# Patient Record
Sex: FEMALE | Race: WHITE | Employment: PART TIME | URBAN - METROPOLITAN AREA
[De-identification: names, ages, dates, MRNs, and addresses within clinical notes are randomized per-mention and may not be internally consistent; named-entity substitution may affect disease eponyms.]

---

## 2017-01-07 RX ORDER — AMLODIPINE BESYLATE 5 MG/1
TABLET ORAL
Qty: 30 TABLET | Refills: 6 | Status: SHIPPED | OUTPATIENT
Start: 2017-01-07 | End: 2017-06-20

## 2017-01-13 ENCOUNTER — TELEPHONE (OUTPATIENT)
Dept: INTERNAL MEDICINE CLINIC | Facility: CLINIC | Age: 47
End: 2017-01-13

## 2017-01-13 NOTE — TELEPHONE ENCOUNTER
Pt called in requesting her normal routine labs that she has every 3 months to please be placed. Pt is looking to come in tomorrow or early next week to get her labs done. Pt is asking for a call back once labs are placed, please.

## 2017-01-16 ENCOUNTER — TELEPHONE (OUTPATIENT)
Dept: INTERNAL MEDICINE CLINIC | Facility: CLINIC | Age: 47
End: 2017-01-16

## 2017-01-16 DIAGNOSIS — E55.9 VITAMIN D DEFICIENCY: ICD-10-CM

## 2017-01-16 DIAGNOSIS — E11.9 TYPE 2 DIABETES MELLITUS WITHOUT COMPLICATION, WITHOUT LONG-TERM CURRENT USE OF INSULIN (HCC): Primary | ICD-10-CM

## 2017-01-20 ENCOUNTER — LAB ENCOUNTER (OUTPATIENT)
Dept: LAB | Age: 47
End: 2017-01-20
Attending: INTERNAL MEDICINE
Payer: MEDICAID

## 2017-01-20 DIAGNOSIS — E55.9 VITAMIN D DEFICIENCY: ICD-10-CM

## 2017-01-20 DIAGNOSIS — E11.9 TYPE 2 DIABETES MELLITUS WITHOUT COMPLICATION, WITHOUT LONG-TERM CURRENT USE OF INSULIN (HCC): ICD-10-CM

## 2017-01-20 LAB
ALBUMIN SERPL BCP-MCNC: 3.5 G/DL (ref 3.5–4.8)
ALBUMIN/GLOB SERPL: 1.1 {RATIO} (ref 1–2)
ALP SERPL-CCNC: 44 U/L (ref 32–100)
ALT SERPL-CCNC: 38 U/L (ref 14–54)
ANION GAP SERPL CALC-SCNC: 9 MMOL/L (ref 0–18)
AST SERPL-CCNC: 51 U/L (ref 15–41)
BASOPHILS # BLD: 0.1 K/UL (ref 0–0.2)
BASOPHILS NFR BLD: 1 %
BILIRUB SERPL-MCNC: 0.7 MG/DL (ref 0.3–1.2)
BUN SERPL-MCNC: 12 MG/DL (ref 8–20)
BUN/CREAT SERPL: 15.2 (ref 10–20)
CALCIUM SERPL-MCNC: 8.8 MG/DL (ref 8.5–10.5)
CHLORIDE SERPL-SCNC: 103 MMOL/L (ref 95–110)
CHOLEST SERPL-MCNC: 214 MG/DL (ref 110–200)
CO2 SERPL-SCNC: 22 MMOL/L (ref 22–32)
CREAT SERPL-MCNC: 0.79 MG/DL (ref 0.5–1.5)
CREAT UR-MCNC: 71.9 MG/DL
EOSINOPHIL # BLD: 0.2 K/UL (ref 0–0.7)
EOSINOPHIL NFR BLD: 2 %
ERYTHROCYTE [DISTWIDTH] IN BLOOD BY AUTOMATED COUNT: 13.2 % (ref 11–15)
GLOBULIN PLAS-MCNC: 3.2 G/DL (ref 2.5–3.7)
GLUCOSE SERPL-MCNC: 139 MG/DL (ref 70–99)
HCT VFR BLD AUTO: 43.7 % (ref 35–48)
HDLC SERPL-MCNC: 44 MG/DL
HGB BLD-MCNC: 15.1 G/DL (ref 12–16)
LDLC SERPL CALC-MCNC: 132 MG/DL (ref 0–99)
LYMPHOCYTES # BLD: 2.4 K/UL (ref 1–4)
LYMPHOCYTES NFR BLD: 32 %
MCH RBC QN AUTO: 34.4 PG (ref 27–32)
MCHC RBC AUTO-ENTMCNC: 34.6 G/DL (ref 32–37)
MCV RBC AUTO: 99.4 FL (ref 80–100)
MICROALBUMIN UR-MCNC: 0 MG/DL (ref 0–1.8)
MICROALBUMIN/CREAT UR: 0 MG/G{CREAT} (ref 0–20)
MONOCYTES # BLD: 0.5 K/UL (ref 0–1)
MONOCYTES NFR BLD: 6 %
NEUTROPHILS # BLD AUTO: 4.4 K/UL (ref 1.8–7.7)
NEUTROPHILS NFR BLD: 58 %
NONHDLC SERPL-MCNC: 170 MG/DL
OSMOLALITY UR CALC.SUM OF ELEC: 280 MOSM/KG (ref 275–295)
PLATELET # BLD AUTO: 86 K/UL (ref 140–400)
PMV BLD AUTO: 10.6 FL (ref 7.4–10.3)
POTASSIUM SERPL-SCNC: 4.1 MMOL/L (ref 3.3–5.1)
PROT SERPL-MCNC: 6.7 G/DL (ref 5.9–8.4)
RBC # BLD AUTO: 4.4 M/UL (ref 3.7–5.4)
SODIUM SERPL-SCNC: 134 MMOL/L (ref 136–144)
TRIGL SERPL-MCNC: 191 MG/DL (ref 1–149)
WBC # BLD AUTO: 7.6 K/UL (ref 4–11)

## 2017-01-20 PROCEDURE — 36415 COLL VENOUS BLD VENIPUNCTURE: CPT

## 2017-01-20 PROCEDURE — 82570 ASSAY OF URINE CREATININE: CPT

## 2017-01-20 PROCEDURE — 85025 COMPLETE CBC W/AUTO DIFF WBC: CPT

## 2017-01-20 PROCEDURE — 80053 COMPREHEN METABOLIC PANEL: CPT

## 2017-01-20 PROCEDURE — 82043 UR ALBUMIN QUANTITATIVE: CPT

## 2017-01-20 PROCEDURE — 82306 VITAMIN D 25 HYDROXY: CPT

## 2017-01-20 PROCEDURE — 80061 LIPID PANEL: CPT

## 2017-01-20 PROCEDURE — 83036 HEMOGLOBIN GLYCOSYLATED A1C: CPT

## 2017-01-21 LAB — HBA1C MFR BLD: 8.8 % (ref 4–6)

## 2017-01-23 LAB — 25(OH)D3 SERPL-MCNC: 13.6 NG/ML

## 2017-01-24 ENCOUNTER — OFFICE VISIT (OUTPATIENT)
Dept: INTERNAL MEDICINE CLINIC | Facility: CLINIC | Age: 47
End: 2017-01-24

## 2017-01-24 ENCOUNTER — TELEPHONE (OUTPATIENT)
Dept: INTERNAL MEDICINE CLINIC | Facility: CLINIC | Age: 47
End: 2017-01-24

## 2017-01-24 VITALS
HEART RATE: 85 BPM | BODY MASS INDEX: 36 KG/M2 | SYSTOLIC BLOOD PRESSURE: 121 MMHG | WEIGHT: 214 LBS | RESPIRATION RATE: 21 BRPM | DIASTOLIC BLOOD PRESSURE: 74 MMHG | TEMPERATURE: 98 F

## 2017-01-24 DIAGNOSIS — R74.8 ABNORMAL LIVER ENZYMES: ICD-10-CM

## 2017-01-24 DIAGNOSIS — E11.8 TYPE 2 DIABETES MELLITUS WITH COMPLICATION, WITHOUT LONG-TERM CURRENT USE OF INSULIN (HCC): ICD-10-CM

## 2017-01-24 DIAGNOSIS — D69.6 THROMBOCYTOPENIA (HCC): Primary | ICD-10-CM

## 2017-01-24 DIAGNOSIS — R53.81 MALAISE: ICD-10-CM

## 2017-01-24 PROCEDURE — 99212 OFFICE O/P EST SF 10 MIN: CPT | Performed by: INTERNAL MEDICINE

## 2017-01-24 PROCEDURE — 99214 OFFICE O/P EST MOD 30 MIN: CPT | Performed by: INTERNAL MEDICINE

## 2017-01-24 RX ORDER — DIAPER,BRIEF,ADULT, DISPOSABLE
EACH MISCELLANEOUS
Qty: 100 STRIP | Refills: 11 | Status: SHIPPED | OUTPATIENT
Start: 2017-01-24 | End: 2017-01-28

## 2017-01-25 ENCOUNTER — TELEPHONE (OUTPATIENT)
Dept: INTERNAL MEDICINE CLINIC | Facility: CLINIC | Age: 47
End: 2017-01-25

## 2017-01-25 NOTE — TELEPHONE ENCOUNTER
PT stts she made a appt for ultra sound on 2/2 and wanted to know if she needs to have her blood drawn on 1/26.  Please advise

## 2017-01-25 NOTE — PROGRESS NOTES
HPI:    Patient ID: Munir Polk is a 55year old female presents for follow-up of multiple medical conditions.     HPI  Patient reports that she has been feeling extremely fatigued last several months, spends a lot of time in bed, he has good and SPRAY BY EACH NARE ROUTE DAILY Disp: 1 Bottle Rfl: 6   Needle, Disp, (BD DISP NEEDLES) 30G X 1/2\" Does not apply Misc Pen needles to be used for Byetta injections Disp: 200 each Rfl: 3   VENTOLIN  (90 BASE) MCG/ACT Inhalation Aero Soln INHALE 2 PUF membrane is not erythematous. No cerumen present  Mouth/Throat: Oropharynx is clear and moist. No posterior oropharyngeal erythema. Postnasal drainage is present   Eyes: EOM are normal. Pupils are equal, round, and reactive to light. No scleral icterus. pen 3      Sig: Inject 20 units  Sq  At bed time      TRUETRACK TEST In Vitro Strip 100 strip 11      Sig: Test  tid           Imaging & Referrals:  US ABDOMEN COMPLETE (CPT=76700)         LA#[id number]

## 2017-01-26 ENCOUNTER — LAB ENCOUNTER (OUTPATIENT)
Dept: LAB | Age: 47
End: 2017-01-26
Attending: INTERNAL MEDICINE
Payer: MEDICAID

## 2017-01-26 DIAGNOSIS — D69.6 THROMBOCYTOPENIA (HCC): ICD-10-CM

## 2017-01-26 LAB
BASOPHILS # BLD: 0.1 K/UL (ref 0–0.2)
BASOPHILS NFR BLD: 1 %
EOSINOPHIL # BLD: 0.1 K/UL (ref 0–0.7)
EOSINOPHIL NFR BLD: 2 %
ERYTHROCYTE [DISTWIDTH] IN BLOOD BY AUTOMATED COUNT: 13.3 % (ref 11–15)
FOLATE SERPL-MCNC: 18.5 NG/ML
HCT VFR BLD AUTO: 46 % (ref 35–48)
HGB BLD-MCNC: 15.9 G/DL (ref 12–16)
LYMPHOCYTES # BLD: 2.1 K/UL (ref 1–4)
LYMPHOCYTES NFR BLD: 38 %
MCH RBC QN AUTO: 34.5 PG (ref 27–32)
MCHC RBC AUTO-ENTMCNC: 34.7 G/DL (ref 32–37)
MCV RBC AUTO: 99.4 FL (ref 80–100)
MONOCYTES # BLD: 0.3 K/UL (ref 0–1)
MONOCYTES NFR BLD: 6 %
NEUTROPHILS # BLD AUTO: 3 K/UL (ref 1.8–7.7)
NEUTROPHILS NFR BLD: 53 %
PLATELET # BLD AUTO: 185 K/UL (ref 140–400)
PMV BLD AUTO: 10 FL (ref 7.4–10.3)
RBC # BLD AUTO: 4.62 M/UL (ref 3.7–5.4)
VIT B12 SERPL-MCNC: 630 PG/ML (ref 181–914)
WBC # BLD AUTO: 5.6 K/UL (ref 4–11)

## 2017-01-26 PROCEDURE — 36415 COLL VENOUS BLD VENIPUNCTURE: CPT

## 2017-01-26 PROCEDURE — 82746 ASSAY OF FOLIC ACID SERUM: CPT

## 2017-01-26 PROCEDURE — 82607 VITAMIN B-12: CPT

## 2017-01-26 PROCEDURE — 85025 COMPLETE CBC W/AUTO DIFF WBC: CPT

## 2017-01-27 ENCOUNTER — TELEPHONE (OUTPATIENT)
Dept: INTERNAL MEDICINE CLINIC | Facility: CLINIC | Age: 47
End: 2017-01-27

## 2017-01-27 NOTE — TELEPHONE ENCOUNTER
Pt said Dr Mio Bowman is working on PA for her test strips- nothing in Epic  Tests 3 times daily  Said discussed at 1/24 appt  Out of strips

## 2017-01-28 ENCOUNTER — TELEPHONE (OUTPATIENT)
Dept: INTERNAL MEDICINE CLINIC | Facility: CLINIC | Age: 47
End: 2017-01-28

## 2017-01-28 PROBLEM — D69.6 THROMBOCYTOPENIA (HCC): Status: RESOLVED | Noted: 2017-01-24 | Resolved: 2017-01-28

## 2017-01-28 RX ORDER — DIAPER,BRIEF,ADULT, DISPOSABLE
EACH MISCELLANEOUS
Qty: 100 STRIP | Refills: 11 | Status: SHIPPED | OUTPATIENT
Start: 2017-01-28 | End: 2017-07-11

## 2017-01-28 NOTE — TELEPHONE ENCOUNTER
Spoke to patient reported that CBC platelet count is normal, normal vitamin R88 and folic acid.   Patient will start Lantus SoloSTAR insulin use 5 units at bedtime, monitor blood sugar to 3 times a day, spoke to patient and pharmacist while the fertilizatio

## 2017-01-30 NOTE — TELEPHONE ENCOUNTER
Pt is calling checking the status of PA    and pt also want know if Dr Anderson Simpson want pt to still take her US  Pt is requesting a call back

## 2017-02-01 NOTE — TELEPHONE ENCOUNTER
Pt stated she had the CBC done but wants to canceled US for tomorrow advised by Bessy Paul notes you want her to have the [address] , Pt agreed to have US        ASSESSMENT/PLAN:    Type 2 diabetes mellitus with complication, without long-term current use of insulin (hcc

## 2017-02-02 ENCOUNTER — HOSPITAL ENCOUNTER (OUTPATIENT)
Dept: ULTRASOUND IMAGING | Age: 47
Discharge: HOME | End: 2017-02-02
Attending: INTERNAL MEDICINE
Payer: MEDICAID

## 2017-02-02 DIAGNOSIS — D69.6 THROMBOCYTOPENIA (HCC): ICD-10-CM

## 2017-02-02 DIAGNOSIS — R74.8 ABNORMAL LIVER ENZYMES: ICD-10-CM

## 2017-02-02 PROCEDURE — 76705 ECHO EXAM OF ABDOMEN: CPT

## 2017-02-13 ENCOUNTER — TELEPHONE (OUTPATIENT)
Dept: INTERNAL MEDICINE CLINIC | Facility: CLINIC | Age: 47
End: 2017-02-13

## 2017-02-13 RX ORDER — CODEINE PHOSPHATE AND GUAIFENESIN 10; 100 MG/5ML; MG/5ML
5 SOLUTION ORAL 4 TIMES DAILY PRN
Qty: 240 ML | Refills: 0 | Status: SHIPPED
Start: 2017-02-13 | End: 2017-03-21

## 2017-02-13 RX ORDER — AMOXICILLIN AND CLAVULANATE POTASSIUM 875; 125 MG/1; MG/1
1 TABLET, FILM COATED ORAL 2 TIMES DAILY
Qty: 20 TABLET | Refills: 0 | Status: SHIPPED | OUTPATIENT
Start: 2017-02-13 | End: 2017-03-21

## 2017-02-13 NOTE — TELEPHONE ENCOUNTER
Reason for Call/Chief Complaint: Cold symptoms  Onset: Friday 2/10  Nursing Assessment/Associated Symptoms: Spoke to pt, she states that on Friday she went on very long walk, in and out of the cold and since then, she has felt sick.  States that she has swo operation and will call back if needed. Patient verbalizes understanding and agrees with plan.

## 2017-02-14 NOTE — TELEPHONE ENCOUNTER
I have never seen patient. Needs to see someone in AM or immed care to see if antibiotic needed.  Start delsym M OTC cough syrup for cough

## 2017-02-14 NOTE — TELEPHONE ENCOUNTER
Spoke to patient, will treat you with Augmentin, [facility], she will follow-up in 2 weeks if not feeling better, reported ultrasound of the abdomen showed hepatic steatosis, advised weight loss, low-fat diet georgie

## 2017-02-27 ENCOUNTER — TELEPHONE (OUTPATIENT)
Dept: INTERNAL MEDICINE CLINIC | Facility: CLINIC | Age: 47
End: 2017-02-27

## 2017-02-27 NOTE — TELEPHONE ENCOUNTER
Pt calling regarding having a bad cold. Pt state that she has the chills,  Pt also that she is on an antibiotic but it is not working. .. please advise

## 2017-02-27 NOTE — TELEPHONE ENCOUNTER
Reason for Call/Chief Complaint:   Head cold  Onset:   Friday  Nursing Assessment/Associated Symptoms:   Patient stated has a sore throat, chills temperature 98, runny/stuffy nose, body aches, headaches, post nasal, chest congestion, bilateral ear aches,

## 2017-02-28 NOTE — TELEPHONE ENCOUNTER
Spoke to patient, sounds like she has mild syndrome, will see him second day if needed she will report on Friday if she did not improve

## 2017-03-09 NOTE — TELEPHONE ENCOUNTER
Patient requesting refill for Cheratussin AC Syrup. LOV 10/28/16. Last refilled on 02/13/17 for 240 ml / 0 refills. Please advise.

## 2017-03-15 RX ORDER — CODEINE PHOSPHATE/GUAIFENESIN 20-200/10
LIQUID (ML) ORAL
Qty: 240 ML | Refills: 0 | OUTPATIENT
Start: 2017-03-15

## 2017-03-15 NOTE — TELEPHONE ENCOUNTER
Pt states she has a flu, states she feel head congestion. Pt has a dry cough, with low grade fever. Pt states it's a sinus infection (Yellow & green mucus)  Pt canceled her apt yesterday since she was not feeling well. Please advise.        *Tong martinez

## 2017-03-15 NOTE — TELEPHONE ENCOUNTER
Actions Requested: Appt scheduled, pt requesting rx. Situation/Background   Problem: cough, congestion, fever   Onset: 1 month   Associated Symptoms: productive cough, yellow sinus drainage, temperature 94.3 (verified with pt), nausea.    History of Same:

## 2017-03-15 NOTE — TELEPHONE ENCOUNTER
Call patient that she needs to be seen before I will continue cough syrup medication, and patient to my schedule this week if needed, call pharmacy denied prescription after talking to patient

## 2017-03-15 NOTE — TELEPHONE ENCOUNTER
Spoke with pt. Pt. States she don`t have a ride this week. Appt. Made for Tuesday. Prescription declined per nk until pt. See md.  Pt. Verbalized understanding.

## 2017-03-21 ENCOUNTER — TELEPHONE (OUTPATIENT)
Dept: INTERNAL MEDICINE CLINIC | Facility: CLINIC | Age: 47
End: 2017-03-21

## 2017-03-21 ENCOUNTER — OFFICE VISIT (OUTPATIENT)
Dept: INTERNAL MEDICINE CLINIC | Facility: CLINIC | Age: 47
End: 2017-03-21

## 2017-03-21 VITALS
TEMPERATURE: 98 F | HEART RATE: 81 BPM | RESPIRATION RATE: 20 BRPM | WEIGHT: 213.63 LBS | SYSTOLIC BLOOD PRESSURE: 135 MMHG | BODY MASS INDEX: 36 KG/M2 | DIASTOLIC BLOOD PRESSURE: 72 MMHG

## 2017-03-21 DIAGNOSIS — Z01.00 DIABETIC EYE EXAM (HCC): ICD-10-CM

## 2017-03-21 DIAGNOSIS — E78.00 PURE HYPERCHOLESTEROLEMIA: ICD-10-CM

## 2017-03-21 DIAGNOSIS — Z12.31 ENCOUNTER FOR SCREENING MAMMOGRAM FOR MALIGNANT NEOPLASM OF BREAST: ICD-10-CM

## 2017-03-21 DIAGNOSIS — E11.8 TYPE 2 DIABETES MELLITUS WITH COMPLICATION, WITH LONG-TERM CURRENT USE OF INSULIN (HCC): ICD-10-CM

## 2017-03-21 DIAGNOSIS — E11.9 DIABETIC EYE EXAM (HCC): ICD-10-CM

## 2017-03-21 DIAGNOSIS — R05.9 COUGH: Primary | ICD-10-CM

## 2017-03-21 DIAGNOSIS — Z79.4 TYPE 2 DIABETES MELLITUS WITH COMPLICATION, WITH LONG-TERM CURRENT USE OF INSULIN (HCC): ICD-10-CM

## 2017-03-21 PROCEDURE — 99212 OFFICE O/P EST SF 10 MIN: CPT | Performed by: INTERNAL MEDICINE

## 2017-03-21 PROCEDURE — 99214 OFFICE O/P EST MOD 30 MIN: CPT | Performed by: INTERNAL MEDICINE

## 2017-03-21 RX ORDER — CODEINE PHOSPHATE AND GUAIFENESIN 10; 100 MG/5ML; MG/5ML
5 SOLUTION ORAL EVERY 6 HOURS PRN
Qty: 236 ML | Refills: 0 | Status: SHIPPED | OUTPATIENT
Start: 2017-03-21 | End: 2017-09-26

## 2017-03-21 RX ORDER — PANTOPRAZOLE SODIUM 40 MG/1
40 TABLET, DELAYED RELEASE ORAL
Qty: 30 TABLET | Refills: 6 | Status: SHIPPED | OUTPATIENT
Start: 2017-03-21 | End: 2017-09-12

## 2017-03-21 RX ORDER — LORATADINE 10 MG/1
10 TABLET ORAL DAILY
Qty: 30 TABLET | Refills: 3 | Status: SHIPPED | OUTPATIENT
Start: 2017-03-21 | End: 2017-09-26

## 2017-03-21 RX ORDER — ERGOCALCIFEROL (VITAMIN D2) 1250 MCG
50000 CAPSULE ORAL WEEKLY
Qty: 12 CAPSULE | Refills: 1 | Status: SHIPPED | OUTPATIENT
Start: 2017-03-21 | End: 2017-04-20

## 2017-03-21 RX ORDER — MONTELUKAST SODIUM 10 MG/1
10 TABLET ORAL NIGHTLY
Qty: 30 TABLET | Refills: 6 | Status: SHIPPED | OUTPATIENT
Start: 2017-03-21 | End: 2018-06-19

## 2017-03-21 RX ORDER — ALBUTEROL SULFATE 90 UG/1
2 AEROSOL, METERED RESPIRATORY (INHALATION) EVERY 4 HOURS PRN
Qty: 1 INHALER | Refills: 3 | Status: SHIPPED | OUTPATIENT
Start: 2017-03-21 | End: 2017-09-22

## 2017-03-22 NOTE — PROGRESS NOTES
HPI:    Patient ID: Sloane Borja is a 55year old female presents for evaluation of the cough, follow-up of diabetes    HPI  Patient reports this for about a month she has been coughing, hitting intermittent chills sweating, bothered by every 6 (six) hours as needed for cough. Disp: 236 mL Rfl: 0   Albuterol Sulfate HFA (PROAIR HFA) 108 (90 Base) MCG/ACT Inhalation Aero Soln Inhale 2 puffs into the lungs every 4 (four) hours as needed for Wheezing.  Disp: 1 Inhaler Rfl: 3   Pantoprazole So apply Kit by Does not apply route. Test 3 times a day Disp:  Rfl:    Blood Glucose Calibration (TRUETRACK GLUCOSE CONTROL VI) by In Vitro route.  Test 3 times a day Disp:  Rfl:      Allergies:  Clarithromycin            Tavist [Clemastine]        /72 Neurological: She is alert and oriented to person, place, and time. No cranial nerve deficit or motor deficit. Gait normal.   Skin: Skin is warm and dry. No rash noted. No erythema. Psychiatric: She has a normal mood and affect.  Her behavior is normal. Referrals:  OPHTHALMOLOGY - INTERNAL  ENT - INTERNAL  XR CHEST PA + LAT CHEST (CPT=71020)  SABINO SCREENING BILAT (CPT=77067)         [facility]#[id number]

## 2017-05-05 RX ORDER — GLIPIZIDE 5 MG/1
TABLET, FILM COATED, EXTENDED RELEASE ORAL
Qty: 36 TABLET | Refills: 2 | Status: SHIPPED | OUTPATIENT
Start: 2017-05-05 | End: 2017-06-20

## 2017-06-02 ENCOUNTER — TELEPHONE (OUTPATIENT)
Dept: INTERNAL MEDICINE CLINIC | Facility: CLINIC | Age: 47
End: 2017-06-02

## 2017-06-02 NOTE — TELEPHONE ENCOUNTER
Pt would like an appt to see  next week at the [facility] location after 4:00 pm. There are no available appointments. Per pt this is for follow up after hospital discharge.

## 2017-06-02 NOTE — TELEPHONE ENCOUNTER
Please follow-up call patient, you can place her on my schedule next week Monday Wednesday Friday double book for 4;40 appt next week if needed, instruct if possible to have records  Form her hospitalisations faxed to my office  [phone] jhon to appt

## 2017-06-05 NOTE — TELEPHONE ENCOUNTER
Pt. Made appt. With DR. Chad Morejon on June 20,2017 [facility] f/u. Instructed to bring medical records or discharged records. Pt. Verbalized understanding.

## 2017-06-18 ENCOUNTER — LAB ENCOUNTER (OUTPATIENT)
Dept: LAB | Facility: HOSPITAL | Age: 47
End: 2017-06-18
Attending: INTERNAL MEDICINE
Payer: MEDICAID

## 2017-06-18 DIAGNOSIS — E78.00 PURE HYPERCHOLESTEROLEMIA: ICD-10-CM

## 2017-06-18 DIAGNOSIS — E11.8 TYPE 2 DIABETES MELLITUS WITH COMPLICATION, WITH LONG-TERM CURRENT USE OF INSULIN (HCC): ICD-10-CM

## 2017-06-18 DIAGNOSIS — Z79.4 TYPE 2 DIABETES MELLITUS WITH COMPLICATION, WITH LONG-TERM CURRENT USE OF INSULIN (HCC): ICD-10-CM

## 2017-06-18 PROCEDURE — 84443 ASSAY THYROID STIM HORMONE: CPT

## 2017-06-18 PROCEDURE — 80061 LIPID PANEL: CPT

## 2017-06-18 PROCEDURE — 83036 HEMOGLOBIN GLYCOSYLATED A1C: CPT

## 2017-06-18 PROCEDURE — 85025 COMPLETE CBC W/AUTO DIFF WBC: CPT

## 2017-06-18 PROCEDURE — 82043 UR ALBUMIN QUANTITATIVE: CPT

## 2017-06-18 PROCEDURE — 80053 COMPREHEN METABOLIC PANEL: CPT

## 2017-06-18 PROCEDURE — 36415 COLL VENOUS BLD VENIPUNCTURE: CPT

## 2017-06-18 PROCEDURE — 82570 ASSAY OF URINE CREATININE: CPT

## 2017-06-20 ENCOUNTER — OFFICE VISIT (OUTPATIENT)
Dept: INTERNAL MEDICINE CLINIC | Facility: CLINIC | Age: 47
End: 2017-06-20

## 2017-06-20 VITALS
HEART RATE: 92 BPM | DIASTOLIC BLOOD PRESSURE: 74 MMHG | SYSTOLIC BLOOD PRESSURE: 131 MMHG | RESPIRATION RATE: 22 BRPM | TEMPERATURE: 98 F | WEIGHT: 206 LBS | BODY MASS INDEX: 34 KG/M2

## 2017-06-20 DIAGNOSIS — E55.9 VITAMIN D DEFICIENCY: ICD-10-CM

## 2017-06-20 DIAGNOSIS — E78.5 HYPERLIPIDEMIA, UNSPECIFIED HYPERLIPIDEMIA TYPE: ICD-10-CM

## 2017-06-20 DIAGNOSIS — E11.9 TYPE 2 DIABETES MELLITUS WITHOUT COMPLICATION, WITHOUT LONG-TERM CURRENT USE OF INSULIN (HCC): Primary | ICD-10-CM

## 2017-06-20 DIAGNOSIS — F17.200 MODERATE TOBACCO DEPENDENCE: ICD-10-CM

## 2017-06-20 PROCEDURE — 99214 OFFICE O/P EST MOD 30 MIN: CPT | Performed by: INTERNAL MEDICINE

## 2017-06-20 PROCEDURE — 99212 OFFICE O/P EST SF 10 MIN: CPT | Performed by: INTERNAL MEDICINE

## 2017-06-20 RX ORDER — AMLODIPINE BESYLATE 10 MG/1
10 TABLET ORAL
COMMUNITY
Start: 2017-05-30 | End: 2017-06-20

## 2017-06-20 RX ORDER — AMLODIPINE BESYLATE 10 MG/1
10 TABLET ORAL DAILY
Qty: 30 TABLET | Refills: 6 | Status: SHIPPED | OUTPATIENT
Start: 2017-06-20 | End: 2018-01-06

## 2017-06-20 RX ORDER — GLIPIZIDE 5 MG/1
TABLET, FILM COATED, EXTENDED RELEASE ORAL
Qty: 90 TABLET | Refills: 2 | Status: SHIPPED | OUTPATIENT
Start: 2017-06-20 | End: 2017-09-22

## 2017-06-20 RX ORDER — DEXTROAMPHETAMINE SACCHARATE, AMPHETAMINE ASPARTATE MONOHYDRATE, DEXTROAMPHETAMINE SULFATE AND AMPHETAMINE SULFATE 5; 5; 5; 5 MG/1; MG/1; MG/1; MG/1
20 CAPSULE, EXTENDED RELEASE ORAL EVERY MORNING
COMMUNITY

## 2017-06-21 NOTE — PROGRESS NOTES
HPI:    Patient ID: Conner Jenkins is a 52year old female presents for follow-up of multiple medical conditions.     HPI  Patient reports that she was treated at [facility] Disp: 30 tablet Rfl: 6   guaiFENesin-codeine (CHERATUSSIN AC) 100-10 MG/5ML Oral Solution Take 5 mL by mouth every 6 (six) hours as needed for cough.  Disp: 236 mL Rfl: 0   Albuterol Sulfate HFA (PROAIR HFA) 108 (90 Base) MCG/ACT Inhalation Aero So well-nourished. No distress. HENT:   Head: Normocephalic and atraumatic. Mouth/Throat: Oropharynx is clear and moist. No posterior oropharyngeal erythema. Eyes: EOM are normal. Pupils are equal, round, and reactive to light. No scleral icterus.    Nec Platelet [E]  Comp Metabolic Panel (14) [E]  Hemoglobin A1C [E]  Lipid Panel [E]  Vitamin D, 25-Hydroxy [E]    Meds This Visit:  No prescriptions requested or ordered in this encounter       Imaging & Referrals:  None         #[id number]

## 2017-07-11 RX ORDER — DIAPER,BRIEF,ADULT, DISPOSABLE
EACH MISCELLANEOUS
Qty: 100 STRIP | Refills: 10 | Status: SHIPPED | OUTPATIENT
Start: 2017-07-11 | End: 2018-05-09

## 2017-07-26 ENCOUNTER — TELEPHONE (OUTPATIENT)
Dept: INTERNAL MEDICINE CLINIC | Facility: CLINIC | Age: 47
End: 2017-07-26

## 2017-07-26 ENCOUNTER — TELEPHONE (OUTPATIENT)
Dept: NEUROLOGY | Facility: CLINIC | Age: 47
End: 2017-07-26

## 2017-07-26 NOTE — TELEPHONE ENCOUNTER
Pt. requesting to speak to RN regarding trying to get Dr Laura Abdi help in scheduling a sooner appt. For the pt.  To see Dr Ye Luu Specialist.

## 2017-08-01 ENCOUNTER — HOSPITAL ENCOUNTER (OUTPATIENT)
Dept: MRI IMAGING | Age: 47
Discharge: HOME | End: 2017-08-01
Attending: INTERNAL MEDICINE
Payer: COMMERCIAL

## 2017-08-01 ENCOUNTER — TELEPHONE (OUTPATIENT)
Dept: INTERNAL MEDICINE CLINIC | Facility: CLINIC | Age: 47
End: 2017-08-01

## 2017-08-01 ENCOUNTER — HOSPITAL ENCOUNTER (OUTPATIENT)
Dept: MAMMOGRAPHY | Age: 47
Discharge: HOME | End: 2017-08-01
Attending: INTERNAL MEDICINE
Payer: COMMERCIAL

## 2017-08-01 DIAGNOSIS — Z12.31 ENCOUNTER FOR SCREENING MAMMOGRAM FOR MALIGNANT NEOPLASM OF BREAST: ICD-10-CM

## 2017-08-01 DIAGNOSIS — G44.89 OTHER HEADACHE SYNDROME: ICD-10-CM

## 2017-08-01 PROCEDURE — 77067 SCR MAMMO BI INCL CAD: CPT | Performed by: INTERNAL MEDICINE

## 2017-08-01 NOTE — TELEPHONE ENCOUNTER
Called pt. Left message 2x. Pt. Need to call Darya to change location of MRI to [address] [place] [address]. Pt. [facility] Pt. Can call back [phone]. Note    Approved for [facility] [place].   Message sent to insurance verific

## 2017-08-01 NOTE — TELEPHONE ENCOUNTER
Rafael from [facility] Verification is calling to advise our office that a pre-auth was not obtained for this patient.  The patient is still proceeding with testing today, but we need to get approved ASAP or this may result in patient being billed for this

## 2017-08-01 NOTE — TELEPHONE ENCOUNTER
Spoke with Rafael,  Informed that REFERRal approved:  Note    Approved for [facility] [address]   Message sent to insurance verification            Type Date User Summary Attachment    08/29/2016 11:03 AM Chery Scott - -   Note    Tracking # [id number]

## 2017-08-03 NOTE — TELEPHONE ENCOUNTER
Authorization in EPIC is for 2016. Authorization was not obtained prior to test being done on 8/1/2017. Unable to get retro with HANK.

## 2017-08-10 ENCOUNTER — OFFICE VISIT (OUTPATIENT)
Dept: OPHTHALMOLOGY | Facility: CLINIC | Age: 47
End: 2017-08-10

## 2017-08-10 DIAGNOSIS — H52.4 MYOPIA WITH PRESBYOPIA OF BOTH EYES: ICD-10-CM

## 2017-08-10 DIAGNOSIS — H52.13 MYOPIA WITH PRESBYOPIA OF BOTH EYES: ICD-10-CM

## 2017-08-10 DIAGNOSIS — E10.9 TYPE 1 DIABETES MELLITUS WITHOUT RETINOPATHY (HCC): Primary | ICD-10-CM

## 2017-08-10 PROCEDURE — 99243 OFF/OP CNSLTJ NEW/EST LOW 30: CPT | Performed by: OPHTHALMOLOGY

## 2017-08-10 PROCEDURE — 92015 DETERMINE REFRACTIVE STATE: CPT | Performed by: OPHTHALMOLOGY

## 2017-08-10 PROCEDURE — 99212 OFFICE O/P EST SF 10 MIN: CPT | Performed by: OPHTHALMOLOGY

## 2017-08-10 NOTE — PATIENT INSTRUCTIONS
Type 1 diabetes mellitus without retinopathy ([facility] [address]  Diabetes type I: no background retinopathy, no signs of neovascularization noted. Discussed ocular and systemic benefits of blood sugar control.   Discussed with patient that it is important that they con

## 2017-08-10 NOTE — ASSESSMENT & PLAN NOTE
Diabetes type I: no background retinopathy, no signs of neovascularization noted. Discussed ocular and systemic benefits of blood sugar control.   Discussed with patient that it is important that they continue to follow up with an ophthalmologist once a ye

## 2017-08-10 NOTE — PROGRESS NOTES
Allan Haile is a 52year old female.     HPI:     HPI     Consult    Additional comments: Consult per Dr. Grzegorz Ochoa            Diabetic Eye Exam    Additional comments: Pt has been a diabetic for 10 years  10 years on pills/  10 years on Ins Medications:    Current Outpatient Prescriptions:  TERCONAZOLE 0.4 % Vaginal Cream PLACE 1 APPLICATOR VAGINALLY NIGHTLY.  Disp: 45 g Rfl: 0   TRUETRACK TEST In Vitro Strip TEST THREE TIMES A DAY Disp: 100 strip Rfl: 10   Amphetamine-Dextroamphet ER 20 Kit by Does not apply route. Test 3 times a day Disp:  Rfl:    Blood Glucose Calibration (TRUETRACK GLUCOSE CONTROL VI) by In Vitro route. Test 3 times a day Disp:  Rfl:    alprazolam (XANAX) 1 MG Oral Tab 4 (four) times daily as needed.    Disp:  Rfl: 2 Final Rx       Sphere Cylinder [place] [facility] [address] [facility]    Right -4.50 Sphere  20/25- +1.75 20/20    Left -5.50 +1.00 045 20/20- +1.75 20/20    Type:  Progressive bifocal                 ASSESSMENT/PLAN:     Diagnoses and Plan:     Type 1 diabetes mellitus

## 2017-08-17 ENCOUNTER — TELEPHONE (OUTPATIENT)
Dept: INTERNAL MEDICINE CLINIC | Facility: CLINIC | Age: 47
End: 2017-08-17

## 2017-08-17 ENCOUNTER — NURSE TRIAGE (OUTPATIENT)
Dept: OTHER | Age: 47
End: 2017-08-17

## 2017-08-17 RX ORDER — CODEINE PHOSPHATE AND GUAIFENESIN 10; 100 MG/5ML; MG/5ML
5 SOLUTION ORAL EVERY 6 HOURS PRN
Qty: 236 ML | Refills: 0 | Status: CANCELLED | OUTPATIENT
Start: 2017-08-17

## 2017-08-17 RX ORDER — CODEINE PHOSPHATE AND GUAIFENESIN 10; 100 MG/5ML; MG/5ML
5 SOLUTION ORAL EVERY 6 HOURS PRN
Qty: 180 ML | Refills: 0 | OUTPATIENT
Start: 2017-08-17 | End: 2017-09-12

## 2017-08-17 RX ORDER — AMOXICILLIN AND CLAVULANATE POTASSIUM 875; 125 MG/1; MG/1
1 TABLET, FILM COATED ORAL 2 TIMES DAILY
Qty: 20 TABLET | Refills: 0 | Status: SHIPPED | OUTPATIENT
Start: 2017-08-17 | End: 2017-09-12

## 2017-08-17 NOTE — TELEPHONE ENCOUNTER
Action Requested: Summary for Provider     []  Critical Lab, Recommendations Needed  [] Need Additional Advice  []   FYI    []   Need Orders  [x] Need Medications Sent to Pharmacy  []  Other     SUMMARY: Patient asking for abx/cough medication for what she

## 2017-08-18 NOTE — TELEPHONE ENCOUNTER
Spoke to patient, prescription for Augmentin fax, she will use Robitussin-AC cough suppressant, and follow-up on August28

## 2017-08-27 ENCOUNTER — LAB ENCOUNTER (OUTPATIENT)
Dept: LAB | Facility: HOSPITAL | Age: 47
End: 2017-08-27
Attending: INTERNAL MEDICINE
Payer: COMMERCIAL

## 2017-08-27 DIAGNOSIS — E11.9 TYPE 2 DIABETES MELLITUS WITHOUT COMPLICATION, WITHOUT LONG-TERM CURRENT USE OF INSULIN (HCC): ICD-10-CM

## 2017-08-27 DIAGNOSIS — E55.9 VITAMIN D DEFICIENCY: ICD-10-CM

## 2017-08-27 LAB
ALBUMIN SERPL BCP-MCNC: 3.5 G/DL (ref 3.5–4.8)
ALBUMIN/GLOB SERPL: 1 {RATIO} (ref 1–2)
ALP SERPL-CCNC: 52 U/L (ref 32–100)
ALT SERPL-CCNC: 42 U/L (ref 14–54)
ANION GAP SERPL CALC-SCNC: 7 MMOL/L (ref 0–18)
AST SERPL-CCNC: 46 U/L (ref 15–41)
BASOPHILS # BLD: 0 K/UL (ref 0–0.2)
BASOPHILS NFR BLD: 1 %
BILIRUB SERPL-MCNC: 0.8 MG/DL (ref 0.3–1.2)
BUN SERPL-MCNC: 10 MG/DL (ref 8–20)
BUN/CREAT SERPL: 17.5 (ref 10–20)
CALCIUM SERPL-MCNC: 8.8 MG/DL (ref 8.5–10.5)
CHLORIDE SERPL-SCNC: 105 MMOL/L (ref 95–110)
CHOLEST SERPL-MCNC: 195 MG/DL (ref 110–200)
CO2 SERPL-SCNC: 25 MMOL/L (ref 22–32)
CREAT SERPL-MCNC: 0.57 MG/DL (ref 0.5–1.5)
EOSINOPHIL # BLD: 0.1 K/UL (ref 0–0.7)
EOSINOPHIL NFR BLD: 2 %
ERYTHROCYTE [DISTWIDTH] IN BLOOD BY AUTOMATED COUNT: 12.8 % (ref 11–15)
GLOBULIN PLAS-MCNC: 3.5 G/DL (ref 2.5–3.7)
GLUCOSE SERPL-MCNC: 99 MG/DL (ref 70–99)
HCT VFR BLD AUTO: 45.3 % (ref 35–48)
HDLC SERPL-MCNC: 42 MG/DL
HGB BLD-MCNC: 15.5 G/DL (ref 12–16)
LDLC SERPL CALC-MCNC: 122 MG/DL (ref 0–99)
LYMPHOCYTES # BLD: 2.6 K/UL (ref 1–4)
LYMPHOCYTES NFR BLD: 52 %
MCH RBC QN AUTO: 34 PG (ref 27–32)
MCHC RBC AUTO-ENTMCNC: 34.1 G/DL (ref 32–37)
MCV RBC AUTO: 99.6 FL (ref 80–100)
MONOCYTES # BLD: 0.3 K/UL (ref 0–1)
MONOCYTES NFR BLD: 6 %
NEUTROPHILS # BLD AUTO: 2 K/UL (ref 1.8–7.7)
NEUTROPHILS NFR BLD: 40 %
NONHDLC SERPL-MCNC: 153 MG/DL
OSMOLALITY UR CALC.SUM OF ELEC: 283 MOSM/KG (ref 275–295)
PLATELET # BLD AUTO: 172 K/UL (ref 140–400)
PMV BLD AUTO: 9.7 FL (ref 7.4–10.3)
POTASSIUM SERPL-SCNC: 3.8 MMOL/L (ref 3.3–5.1)
PROT SERPL-MCNC: 7 G/DL (ref 5.9–8.4)
RBC # BLD AUTO: 4.55 M/UL (ref 3.7–5.4)
SODIUM SERPL-SCNC: 137 MMOL/L (ref 136–144)
TRIGL SERPL-MCNC: 153 MG/DL (ref 1–149)
WBC # BLD AUTO: 5.1 K/UL (ref 4–11)

## 2017-08-27 PROCEDURE — 36415 COLL VENOUS BLD VENIPUNCTURE: CPT

## 2017-08-27 PROCEDURE — 83036 HEMOGLOBIN GLYCOSYLATED A1C: CPT

## 2017-08-27 PROCEDURE — 82306 VITAMIN D 25 HYDROXY: CPT

## 2017-08-27 PROCEDURE — 80061 LIPID PANEL: CPT

## 2017-08-27 PROCEDURE — 85025 COMPLETE CBC W/AUTO DIFF WBC: CPT

## 2017-08-27 PROCEDURE — 80053 COMPREHEN METABOLIC PANEL: CPT

## 2017-08-28 LAB
25(OH)D3 SERPL-MCNC: 20 NG/ML
HBA1C MFR BLD: 8.2 % (ref 4–6)

## 2017-08-29 ENCOUNTER — NURSE TRIAGE (OUTPATIENT)
Dept: INTERNAL MEDICINE CLINIC | Facility: CLINIC | Age: 47
End: 2017-08-29

## 2017-08-29 ENCOUNTER — TELEPHONE (OUTPATIENT)
Dept: OTHER | Age: 47
End: 2017-08-29

## 2017-08-29 NOTE — TELEPHONE ENCOUNTER
Action Requested: Summary for Provider     []  Critical Lab, Recommendations Needed  [x] Need Additional Advice  []   FYI    []   Need Orders  [x] Need Medications Sent to Pharmacy  []  Other     SUMMARY: patient had an appointment today with Dr Maggi Rivera but patient to drink plenty of fluids and rest, cleaning is not going anywhere and can wait. Not in distress and does not want to go to the ER. Patient agreed that if short of breath, wheezing, fevers, or symptoms worse to go to ER.       Reason for Disposition

## 2017-08-29 NOTE — TELEPHONE ENCOUNTER
Dr. Geovanni Turner    I called patient home to follow up on message below of \"not feeling well\" and spoke with Nola Espinoza ([facility]) and patient is \"getting her much needed rest\" and declined to come to the phone. He states patient will see Dr. Riley Jay at 9/12/17.

## 2017-08-29 NOTE — TELEPHONE ENCOUNTER
Pt wanted Dr Jesus Dasilva to be aware she cancelled  appt today because she is not feeling well & also has transportation issues  Rescheduled her appt for 9/12

## 2017-08-30 NOTE — TELEPHONE ENCOUNTER
Message reviewed per Dr. Sarmiento Prior. Pt verbalized understanding and denied any further questions at this time. Pt states that she will monitor symptoms and if she still does not feel well for the next few days, she will go to IC if necessary.

## 2017-09-05 ENCOUNTER — TELEPHONE (OUTPATIENT)
Dept: INTERNAL MEDICINE CLINIC | Facility: CLINIC | Age: 47
End: 2017-09-05

## 2017-09-05 RX ORDER — ERGOCALCIFEROL (VITAMIN D2) 1250 MCG
50000 CAPSULE ORAL WEEKLY
Qty: 12 CAPSULE | Refills: 0 | Status: SHIPPED | OUTPATIENT
Start: 2017-09-05 | End: 2017-10-05

## 2017-09-06 ENCOUNTER — TELEPHONE (OUTPATIENT)
Dept: OTHER | Age: 47
End: 2017-09-06

## 2017-09-06 NOTE — TELEPHONE ENCOUNTER
LMTCB    Please transfer [id number] anytime. Thank you. Pt has future OV appt 9/12/17 @ 4:40 pm noted in chart.     Notes Recorded by Sadie lAy MD on 9/5/2017 at 7:19 AM CDT  Please call patient normal blood count, normal kidney liver function tests,

## 2017-09-12 ENCOUNTER — OFFICE VISIT (OUTPATIENT)
Dept: INTERNAL MEDICINE CLINIC | Facility: CLINIC | Age: 47
End: 2017-09-12

## 2017-09-12 VITALS — DIASTOLIC BLOOD PRESSURE: 80 MMHG | SYSTOLIC BLOOD PRESSURE: 137 MMHG | TEMPERATURE: 98 F | HEART RATE: 108 BPM

## 2017-09-12 DIAGNOSIS — J01.90 ACUTE SINUSITIS, RECURRENCE NOT SPECIFIED, UNSPECIFIED LOCATION: Primary | ICD-10-CM

## 2017-09-12 DIAGNOSIS — J20.9 BRONCHITIS WITH ASTHMA, ACUTE: ICD-10-CM

## 2017-09-12 DIAGNOSIS — M54.9 CHRONIC MIDLINE BACK PAIN, UNSPECIFIED BACK LOCATION: ICD-10-CM

## 2017-09-12 DIAGNOSIS — J45.909 BRONCHITIS WITH ASTHMA, ACUTE: ICD-10-CM

## 2017-09-12 DIAGNOSIS — E11.8 TYPE 2 DIABETES MELLITUS WITH COMPLICATION, WITHOUT LONG-TERM CURRENT USE OF INSULIN (HCC): ICD-10-CM

## 2017-09-12 DIAGNOSIS — G89.29 CHRONIC MIDLINE BACK PAIN, UNSPECIFIED BACK LOCATION: ICD-10-CM

## 2017-09-12 PROCEDURE — 99214 OFFICE O/P EST MOD 30 MIN: CPT | Performed by: INTERNAL MEDICINE

## 2017-09-12 PROCEDURE — 99212 OFFICE O/P EST SF 10 MIN: CPT | Performed by: INTERNAL MEDICINE

## 2017-09-12 RX ORDER — CODEINE PHOSPHATE AND GUAIFENESIN 10; 100 MG/5ML; MG/5ML
5 SOLUTION ORAL 4 TIMES DAILY PRN
Qty: 180 ML | Refills: 0 | Status: SHIPPED | OUTPATIENT
Start: 2017-09-12 | End: 2017-09-26

## 2017-09-12 RX ORDER — OMEPRAZOLE 40 MG/1
40 CAPSULE, DELAYED RELEASE ORAL DAILY
COMMUNITY
End: 2018-02-11

## 2017-09-12 RX ORDER — CEFUROXIME AXETIL 500 MG/1
500 TABLET ORAL 2 TIMES DAILY
Qty: 20 TABLET | Refills: 0 | Status: SHIPPED | OUTPATIENT
Start: 2017-09-12 | End: 2017-09-26

## 2017-09-17 NOTE — PROGRESS NOTES
HPI:    Patient ID: Robert Spangler is a 52year old female presents for follow-up of multiple medical conditions.   Nasal congestion and cough    HPI   Patient reports that about 2 weeks ago she started to experience nasal congestion, excess Cefuroxime Axetil (CEFTIN) 500 MG Oral Tab Take 1 tablet (500 mg total) by mouth 2 (two) times daily. Disp: 20 tablet Rfl: 0   guaiFENesin-codeine (CHERATUSSIN AC) 100-10 MG/5ML Oral Solution Take 5 mL by mouth 4 (four) times daily as needed for cough.  D EVERY 4 HOURS AS NEEDED FOR WHEEZING Disp: 18 g Rfl: 6   alprazolam (XANAX) 1 MG Oral Tab 4 (four) times daily as needed.    Disp:  Rfl: 2   TRUETRACK TEST In Vitro Strip TEST THREE TIMES A DAY Disp: 100 strip Rfl: 10   Blood Glucose Monitoring Suppl (TRUET unspecified location  (primary encounter diagnosis) will treat with Biaxin, continue nasal spray allergy medication singular  Bronchitis with asthma, acute  Type 2 diabetes mellitus with complication, without long-term current use of insulin (hcc) mild exa

## 2017-09-23 RX ORDER — GLIPIZIDE 5 MG/1
TABLET, FILM COATED, EXTENDED RELEASE ORAL
Qty: 90 TABLET | Refills: 1 | Status: SHIPPED | OUTPATIENT
Start: 2017-09-23 | End: 2017-12-04

## 2017-09-26 ENCOUNTER — OFFICE VISIT (OUTPATIENT)
Dept: INTERNAL MEDICINE CLINIC | Facility: CLINIC | Age: 47
End: 2017-09-26

## 2017-09-26 ENCOUNTER — TELEPHONE (OUTPATIENT)
Dept: INTERNAL MEDICINE CLINIC | Facility: CLINIC | Age: 47
End: 2017-09-26

## 2017-09-26 VITALS
SYSTOLIC BLOOD PRESSURE: 134 MMHG | DIASTOLIC BLOOD PRESSURE: 81 MMHG | TEMPERATURE: 98 F | BODY MASS INDEX: 35 KG/M2 | WEIGHT: 210 LBS | HEART RATE: 102 BPM | RESPIRATION RATE: 25 BRPM

## 2017-09-26 DIAGNOSIS — E11.8 TYPE 2 DIABETES MELLITUS WITH COMPLICATION, WITH LONG-TERM CURRENT USE OF INSULIN (HCC): ICD-10-CM

## 2017-09-26 DIAGNOSIS — J32.9 CHRONIC SINUSITIS, UNSPECIFIED LOCATION: Primary | ICD-10-CM

## 2017-09-26 DIAGNOSIS — J20.9 BRONCHITIS WITH ASTHMA, ACUTE: ICD-10-CM

## 2017-09-26 DIAGNOSIS — Z79.4 TYPE 2 DIABETES MELLITUS WITH COMPLICATION, WITH LONG-TERM CURRENT USE OF INSULIN (HCC): ICD-10-CM

## 2017-09-26 DIAGNOSIS — J45.909 BRONCHITIS WITH ASTHMA, ACUTE: ICD-10-CM

## 2017-09-26 PROCEDURE — 99213 OFFICE O/P EST LOW 20 MIN: CPT | Performed by: INTERNAL MEDICINE

## 2017-09-26 PROCEDURE — 99212 OFFICE O/P EST SF 10 MIN: CPT | Performed by: INTERNAL MEDICINE

## 2017-09-26 RX ORDER — LORATADINE 10 MG/1
10 TABLET ORAL DAILY
Qty: 30 TABLET | Refills: 6 | Status: SHIPPED | OUTPATIENT
Start: 2017-09-26 | End: 2018-03-12

## 2017-09-26 RX ORDER — CODEINE PHOSPHATE AND GUAIFENESIN 10; 100 MG/5ML; MG/5ML
5 SOLUTION ORAL 4 TIMES DAILY PRN
Qty: 240 ML | Refills: 0 | Status: SHIPPED | OUTPATIENT
Start: 2017-09-26 | End: 2018-02-20

## 2017-09-27 NOTE — TELEPHONE ENCOUNTER
PA for Lantus solostar 100 units/ml subcutaneous solution pen injector completed with EPS via CMM response time 24-72 hours KEY LUZ.

## 2017-10-01 NOTE — PROGRESS NOTES
HPI:    Patient ID: Jocelynn Hoang is a 52year old female.   Presents for follow-up on cough    HPI  Patient reports that her cough she had 2 weeks ago subsided, still has cough with episodes of phlegm production, she is not wheezing, he ha TIME Disp: 5 pen Rfl: 6   AmLODIPine Besylate 10 MG Oral Tab Take 1 tablet (10 mg total) by mouth daily. Disp: 30 tablet Rfl: 6   Montelukast Sodium 10 MG Oral Tab Take 1 tablet (10 mg total) by mouth nightly.  Disp: 30 tablet Rfl: 6   Mometasone Furo-Formo and moist. No posterior oropharyngeal erythema. Eyes: EOM are normal. Pupils are equal, round, and reactive to light. No scleral icterus. Neck: Normal range of motion. Neck supple. No JVD present.    Cardiovascular: Normal rate, regular rhythm and celeste

## 2017-10-02 ENCOUNTER — TELEPHONE (OUTPATIENT)
Dept: INTERNAL MEDICINE CLINIC | Facility: CLINIC | Age: 47
End: 2017-10-02

## 2017-12-04 RX ORDER — GLIPIZIDE 5 MG/1
TABLET, FILM COATED, EXTENDED RELEASE ORAL
Qty: 90 TABLET | Refills: 0 | Status: SHIPPED | OUTPATIENT
Start: 2017-12-04 | End: 2017-12-15

## 2017-12-15 RX ORDER — FLUTICASONE PROPIONATE 50 MCG
SPRAY, SUSPENSION (ML) NASAL
Qty: 16 G | Refills: 5 | Status: SHIPPED | OUTPATIENT
Start: 2017-12-15 | End: 2018-03-12

## 2017-12-16 RX ORDER — GLIPIZIDE 5 MG/1
TABLET, EXTENDED RELEASE ORAL
Qty: 90 TABLET | Refills: 0 | Status: SHIPPED | OUTPATIENT
Start: 2017-12-16 | End: 2018-03-03

## 2018-01-06 RX ORDER — AMLODIPINE BESYLATE 10 MG/1
TABLET ORAL
Qty: 30 TABLET | Refills: 1 | Status: SHIPPED | OUTPATIENT
Start: 2018-01-06 | End: 2018-04-10

## 2018-01-10 ENCOUNTER — TELEPHONE (OUTPATIENT)
Dept: INTERNAL MEDICINE CLINIC | Facility: CLINIC | Age: 48
End: 2018-01-10

## 2018-01-11 NOTE — TELEPHONE ENCOUNTER
Pt returned call confirmed is going to continue with Dr Elliott Morel as pcp, stated will have new ins in Jan-once effective will call office to schedule f/u  appt with Dr Elliott Morel

## 2018-01-11 NOTE — TELEPHONE ENCOUNTER
Please approve  diabetis  Supplies , I believe  Her insurance  Changed? Check with pt is she  Going to continue  To be my patient?  Needs f-up appt  Then, suppliy may change  Per new insurance  requirement

## 2018-01-11 NOTE — TELEPHONE ENCOUNTER
Diabetes Medications: Please advise on request for new diabetic supplies. Pt is Darya pt.      Protocol Criteria:  · Appointment scheduled in the past 6 months or the next 3 months  · A1C < 7.5 in the past 6 months  · Creatinine in the past 12 months

## 2018-01-12 NOTE — TELEPHONE ENCOUNTER
CSS, please call pt to verify if new insurance is active as it is now 1/12/18. If insurance active, please assist in scheduling f/u appt. Thank you.

## 2018-02-11 RX ORDER — OMEPRAZOLE 40 MG/1
CAPSULE, DELAYED RELEASE ORAL
Qty: 90 CAPSULE | Refills: 0 | Status: SHIPPED | OUTPATIENT
Start: 2018-02-11 | End: 2019-03-26

## 2018-02-13 RX ORDER — GUAIFENESIN AND CODEINE PHOSPHATE 10; 100 MG/5ML; MG/5ML
LIQUID ORAL
Qty: 240 ML | Refills: 0 | OUTPATIENT
Start: 2018-02-13

## 2018-02-13 NOTE — TELEPHONE ENCOUNTER
Pt refused to give updated insurance information over the phone, states [employer] informed her she should not give information over the phone. Advised pt she needs to update insurance information in order to schedule appt, pt again declined.  Asked to have docto

## 2018-02-15 ENCOUNTER — TELEPHONE (OUTPATIENT)
Dept: OTHER | Age: 48
End: 2018-02-15

## 2018-02-15 DIAGNOSIS — E55.9 VITAMIN D DEFICIENCY: ICD-10-CM

## 2018-02-15 DIAGNOSIS — E11.9 TYPE 2 DIABETES MELLITUS WITHOUT COMPLICATION, WITHOUT LONG-TERM CURRENT USE OF INSULIN (HCC): Primary | ICD-10-CM

## 2018-02-15 RX ORDER — AMOXICILLIN AND CLAVULANATE POTASSIUM 875; 125 MG/1; MG/1
1 TABLET, FILM COATED ORAL 2 TIMES DAILY
Qty: 20 TABLET | Refills: 0 | Status: SHIPPED | OUTPATIENT
Start: 2018-02-15 | End: 2018-05-09

## 2018-02-15 NOTE — TELEPHONE ENCOUNTER
Pt states pharmacy has been faxing request for antibiotic prescription. States she can't schedule appt as she doesn't have a car so can't go to IC. Pt states she has a sore throat, cough, has nasal congestion, frontal headache.  Advised pt to drink plenty o

## 2018-02-15 NOTE — TELEPHONE ENCOUNTER
Spoke to patient, advised that they will send prescription for Augmentin, but if she feels that her headache is very severe she states she had worse headaches in the past, she is resting a lot, she needs to make an effort to get to the emergency room so sh

## 2018-02-19 ENCOUNTER — HOSPITAL ENCOUNTER (OUTPATIENT)
Age: 48
Discharge: HOME | End: 2018-02-19
Payer: MEDICAID

## 2018-02-19 ENCOUNTER — APPOINTMENT (OUTPATIENT)
Dept: OCCUPATIONAL MEDICINE | Age: 48
End: 2018-02-19
Attending: PEDIATRICS

## 2018-02-19 ENCOUNTER — LAB ENCOUNTER (OUTPATIENT)
Dept: LAB | Age: 48
End: 2018-02-19
Attending: INTERNAL MEDICINE

## 2018-02-19 VITALS
HEIGHT: 65 IN | WEIGHT: 210 LBS | BODY MASS INDEX: 34.99 KG/M2 | SYSTOLIC BLOOD PRESSURE: 130 MMHG | TEMPERATURE: 98 F | DIASTOLIC BLOOD PRESSURE: 84 MMHG | RESPIRATION RATE: 16 BRPM | HEART RATE: 106 BPM | OXYGEN SATURATION: 96 %

## 2018-02-19 DIAGNOSIS — J01.40 ACUTE NON-RECURRENT PANSINUSITIS: Primary | ICD-10-CM

## 2018-02-19 DIAGNOSIS — E55.9 VITAMIN D DEFICIENCY: ICD-10-CM

## 2018-02-19 DIAGNOSIS — E11.9 TYPE 2 DIABETES MELLITUS WITHOUT COMPLICATION, WITHOUT LONG-TERM CURRENT USE OF INSULIN (HCC): ICD-10-CM

## 2018-02-19 LAB
ALBUMIN SERPL BCP-MCNC: 3.8 G/DL (ref 3.5–4.8)
ALBUMIN/GLOB SERPL: 1.2 {RATIO} (ref 1–2)
ALP SERPL-CCNC: 49 U/L (ref 32–100)
ALT SERPL-CCNC: 70 U/L (ref 14–54)
ANION GAP SERPL CALC-SCNC: 10 MMOL/L (ref 0–18)
AST SERPL-CCNC: 62 U/L (ref 15–41)
B-HCG UR QL: NEGATIVE
BASOPHILS # BLD: 0 K/UL (ref 0–0.2)
BASOPHILS NFR BLD: 1 %
BILIRUB SERPL-MCNC: 0.7 MG/DL (ref 0.3–1.2)
BUN SERPL-MCNC: 11 MG/DL (ref 8–20)
BUN/CREAT SERPL: 16.4 (ref 10–20)
CALCIUM SERPL-MCNC: 8.5 MG/DL (ref 8.5–10.5)
CHLORIDE SERPL-SCNC: 102 MMOL/L (ref 95–110)
CHOLEST SERPL-MCNC: 200 MG/DL (ref 110–200)
CO2 SERPL-SCNC: 22 MMOL/L (ref 22–32)
CREAT SERPL-MCNC: 0.67 MG/DL (ref 0.5–1.5)
CREAT UR-MCNC: 490.2 MG/DL
EOSINOPHIL # BLD: 0.1 K/UL (ref 0–0.7)
EOSINOPHIL NFR BLD: 1 %
ERYTHROCYTE [DISTWIDTH] IN BLOOD BY AUTOMATED COUNT: 13.4 % (ref 11–15)
GLOBULIN PLAS-MCNC: 3.2 G/DL (ref 2.5–3.7)
GLUCOSE SERPL-MCNC: 223 MG/DL (ref 70–99)
HCT VFR BLD AUTO: 45.7 % (ref 35–48)
HDLC SERPL-MCNC: 40 MG/DL
HGB BLD-MCNC: 15.8 G/DL (ref 12–16)
LDLC SERPL CALC-MCNC: 128 MG/DL (ref 0–99)
LYMPHOCYTES # BLD: 1.8 K/UL (ref 1–4)
LYMPHOCYTES NFR BLD: 30 %
MCH RBC QN AUTO: 34.1 PG (ref 27–32)
MCHC RBC AUTO-ENTMCNC: 34.6 G/DL (ref 32–37)
MCV RBC AUTO: 98.6 FL (ref 80–100)
MICROALBUMIN UR-MCNC: 18 MG/DL (ref 0–1.8)
MICROALBUMIN/CREAT UR: 36.7 MG/G{CREAT} (ref 0–20)
MONOCYTES # BLD: 0.4 K/UL (ref 0–1)
MONOCYTES NFR BLD: 7 %
NEUTROPHILS # BLD AUTO: 3.7 K/UL (ref 1.8–7.7)
NEUTROPHILS NFR BLD: 62 %
NONHDLC SERPL-MCNC: 160 MG/DL
OSMOLALITY UR CALC.SUM OF ELEC: 284 MOSM/KG (ref 275–295)
PATIENT FASTING: YES
PLATELET # BLD AUTO: 167 K/UL (ref 140–400)
PMV BLD AUTO: 9.1 FL (ref 7.4–10.3)
POTASSIUM SERPL-SCNC: 3.8 MMOL/L (ref 3.3–5.1)
PROT SERPL-MCNC: 7 G/DL (ref 5.9–8.4)
RBC # BLD AUTO: 4.64 M/UL (ref 3.7–5.4)
SODIUM SERPL-SCNC: 134 MMOL/L (ref 136–144)
TRIGL SERPL-MCNC: 159 MG/DL (ref 1–149)
URINE BILIRUBIN: NEGATIVE
URINE CLARITY: CLEAR
URINE COLOR: YELLOW
URINE GLUCOSE: 500 MG/DL
URINE KETONES: NEGATIVE MG/DL
URINE LEUKOCYTE ESTERASE: NEGATIVE
URINE NITRITE: NEGATIVE
URINE PH: 5.5
URINE PROTEIN: NEGATIVE MG /DL
URINE SPECIFIC GRAVITY: 1.01
URINE UROBILINOGEN: 0.2 MG/DL
WBC # BLD AUTO: 6 K/UL (ref 4–11)

## 2018-02-19 PROCEDURE — 82570 ASSAY OF URINE CREATININE: CPT

## 2018-02-19 PROCEDURE — 81025 URINE PREGNANCY TEST: CPT

## 2018-02-19 PROCEDURE — 99213 OFFICE O/P EST LOW 20 MIN: CPT

## 2018-02-19 PROCEDURE — 81002 URINALYSIS NONAUTO W/O SCOPE: CPT

## 2018-02-19 PROCEDURE — 80061 LIPID PANEL: CPT

## 2018-02-19 PROCEDURE — 36415 COLL VENOUS BLD VENIPUNCTURE: CPT

## 2018-02-19 PROCEDURE — 82043 UR ALBUMIN QUANTITATIVE: CPT

## 2018-02-19 PROCEDURE — 99204 OFFICE O/P NEW MOD 45 MIN: CPT

## 2018-02-19 PROCEDURE — 85025 COMPLETE CBC W/AUTO DIFF WBC: CPT

## 2018-02-19 PROCEDURE — 80053 COMPREHEN METABOLIC PANEL: CPT

## 2018-02-19 PROCEDURE — 83036 HEMOGLOBIN GLYCOSYLATED A1C: CPT

## 2018-02-19 PROCEDURE — 82306 VITAMIN D 25 HYDROXY: CPT

## 2018-02-19 RX ORDER — GUAIFENESIN AND CODEINE PHOSPHATE 100; 10 MG/5ML; MG/5ML
5 SOLUTION ORAL 3 TIMES DAILY
Qty: 50 ML | Refills: 0 | Status: SHIPPED | OUTPATIENT
Start: 2018-02-19 | End: 2018-02-20

## 2018-02-19 NOTE — ED PROVIDER NOTES
Patient presents with:  Headache (neurologic)      HPI:     Kojo Morin is a 52year old female with a past history of diabetes, asthma, depression, hypertension and GERD presents with headache, nasal congestion and facial pressure for t bilateral: normal and external auditory canals clear   +frontal sinus tenderness.   NOSE: nasal turbinates: swollen and red  THROAT: clear, without exudates, post nasal drip, uvula midline and airway patent  LUNGS: clear to auscultation bilaterally; no ral POCT Urine Pregnancy Negative Negative       Diagnosis:    ICD-10-CM    1. Acute non-recurrent pansinusitis J01.40        All results reviewed and discussed with patient. See AVS for detailed discharge instructions for your condition today.     Follow Up

## 2018-02-20 ENCOUNTER — OFFICE VISIT (OUTPATIENT)
Dept: INTERNAL MEDICINE CLINIC | Facility: CLINIC | Age: 48
End: 2018-02-20

## 2018-02-20 VITALS
TEMPERATURE: 98 F | DIASTOLIC BLOOD PRESSURE: 81 MMHG | WEIGHT: 210 LBS | HEART RATE: 108 BPM | BODY MASS INDEX: 35 KG/M2 | SYSTOLIC BLOOD PRESSURE: 134 MMHG

## 2018-02-20 DIAGNOSIS — E11.21 TYPE 2 DIABETES MELLITUS WITH DIABETIC NEPHROPATHY, WITH LONG-TERM CURRENT USE OF INSULIN (HCC): Primary | ICD-10-CM

## 2018-02-20 DIAGNOSIS — J45.20 MILD INTERMITTENT ASTHMA WITHOUT COMPLICATION: ICD-10-CM

## 2018-02-20 DIAGNOSIS — Z79.4 TYPE 2 DIABETES MELLITUS WITH DIABETIC NEPHROPATHY, WITH LONG-TERM CURRENT USE OF INSULIN (HCC): Primary | ICD-10-CM

## 2018-02-20 DIAGNOSIS — R74.8 ABNORMAL LIVER ENZYMES: ICD-10-CM

## 2018-02-20 DIAGNOSIS — J32.4 CHRONIC PANSINUSITIS: ICD-10-CM

## 2018-02-20 DIAGNOSIS — I10 ESSENTIAL HYPERTENSION: ICD-10-CM

## 2018-02-20 DIAGNOSIS — M47.896 OTHER OSTEOARTHRITIS OF SPINE, LUMBAR REGION: ICD-10-CM

## 2018-02-20 LAB — HBA1C MFR BLD: 8.9 % (ref 4–6)

## 2018-02-20 PROCEDURE — 99212 OFFICE O/P EST SF 10 MIN: CPT | Performed by: INTERNAL MEDICINE

## 2018-02-20 PROCEDURE — 99214 OFFICE O/P EST MOD 30 MIN: CPT | Performed by: INTERNAL MEDICINE

## 2018-02-20 RX ORDER — CODEINE PHOSPHATE AND GUAIFENESIN 10; 100 MG/5ML; MG/5ML
5 SOLUTION ORAL 4 TIMES DAILY PRN
Qty: 240 ML | Refills: 0 | Status: SHIPPED | OUTPATIENT
Start: 2018-02-20 | End: 2018-05-09

## 2018-02-21 LAB — 25(OH)D3 SERPL-MCNC: 15.1 NG/ML

## 2018-02-21 NOTE — PROGRESS NOTES
HPI:    Patient ID: Kojo Morin is a 52year old female patient insurance was presents for follow-up of multiple medical conditions    HPI  Changing she was not able to follow through on her problems.   States that she intermittently feel inappropriate interaction and psychiatric symptoms       Current Outpatient Prescriptions:  guaiFENesin-codeine (CHERATUSSIN AC) 100-10 MG/5ML Oral Solution Take 5 mL by mouth 4 (four) times daily as needed for cough.  Disp: 240 mL Rfl: 0   Amoxicillin-Pot Does not apply route 3 (three) times daily. Disp: 1 kit Rfl: 0   RICKEY MICROLET LANCETS Does not apply Misc Test blood glucose 3 times daily. Disp: 100 each Rfl: 0   TERCONAZOLE 0.4 % Vaginal Cream PLACE 1 APPLICATOR VAGINALLY NIGHTLY.  Disp: 45 g Rfl: 0 place, and time. No cranial nerve deficit. Psychiatric: She has a normal mood and affect.  Her behavior is normal. Judgment normal.           ASSESSMENT/PLAN:   Type 2 diabetes mellitus with diabetic nephropathy, with long-term current use of insulin (hcc

## 2018-02-22 RX ORDER — ERGOCALCIFEROL 1.25 MG/1
50000 CAPSULE ORAL WEEKLY
Qty: 12 CAPSULE | Refills: 1 | Status: SHIPPED | OUTPATIENT
Start: 2018-02-22 | End: 2018-11-03

## 2018-02-24 NOTE — TELEPHONE ENCOUNTER
Pt stts has vaginal itching and burning after taking antibiotic. Denies discharge. Pt asking for refill on vaginal cream.  Please reply to pool: AMBAR King

## 2018-03-03 RX ORDER — GLIPIZIDE 5 MG/1
TABLET, FILM COATED, EXTENDED RELEASE ORAL
Qty: 90 TABLET | Refills: 3 | Status: SHIPPED | OUTPATIENT
Start: 2018-03-03 | End: 2018-09-16

## 2018-03-12 ENCOUNTER — TELEPHONE (OUTPATIENT)
Dept: INTERNAL MEDICINE CLINIC | Facility: CLINIC | Age: 48
End: 2018-03-12

## 2018-03-12 ENCOUNTER — NURSE TRIAGE (OUTPATIENT)
Dept: OTHER | Age: 48
End: 2018-03-12

## 2018-03-12 DIAGNOSIS — J32.9 SINUSITIS, UNSPECIFIED CHRONICITY, UNSPECIFIED LOCATION: Primary | ICD-10-CM

## 2018-03-12 RX ORDER — FLUTICASONE PROPIONATE 50 MCG
SPRAY, SUSPENSION (ML) NASAL
Qty: 16 G | Refills: 5 | Status: SHIPPED | OUTPATIENT
Start: 2018-03-12 | End: 2019-03-30

## 2018-03-12 RX ORDER — CEFUROXIME AXETIL 500 MG/1
500 TABLET ORAL 2 TIMES DAILY
Qty: 14 TABLET | Refills: 0 | Status: SHIPPED | OUTPATIENT
Start: 2018-03-12 | End: 2018-05-09

## 2018-03-12 RX ORDER — LORATADINE 10 MG/1
10 TABLET ORAL DAILY
Qty: 30 TABLET | Refills: 6 | Status: SHIPPED | OUTPATIENT
Start: 2018-03-12 | End: 2019-07-09 | Stop reason: SDUPTHER

## 2018-03-12 NOTE — TELEPHONE ENCOUNTER
Spoke to pt   advisied to restart  Claritin  And Flonase  Nasal spray,   Faxed   rx for Ceftin  , if not better   See ENT

## 2018-03-12 NOTE — TELEPHONE ENCOUNTER
Action Requested: Summary for Provider     []  Critical Lab, Recommendations Needed  [x] Need Additional Advice  []   FYI    []   Need Orders  [x] Need Medications Sent to Pharmacy  []  Other     SUMMARY: patient has had on going pressure in her head, tire

## 2018-04-10 RX ORDER — AMLODIPINE BESYLATE 10 MG/1
TABLET ORAL
Qty: 30 TABLET | Refills: 6 | Status: SHIPPED | OUTPATIENT
Start: 2018-04-10 | End: 2018-11-27

## 2018-05-03 ENCOUNTER — TELEPHONE (OUTPATIENT)
Dept: INTERNAL MEDICINE CLINIC | Facility: CLINIC | Age: 48
End: 2018-05-03

## 2018-05-03 DIAGNOSIS — E11.9 TYPE 2 DIABETES MELLITUS WITHOUT COMPLICATION, WITHOUT LONG-TERM CURRENT USE OF INSULIN (HCC): Primary | ICD-10-CM

## 2018-05-03 DIAGNOSIS — E78.00 PURE HYPERCHOLESTEROLEMIA: ICD-10-CM

## 2018-05-03 NOTE — TELEPHONE ENCOUNTER
Please advise to the communication below. Thank you. Boy Almeida Please respond to pool: EM IM LMB LPN/CMA

## 2018-05-07 RX ORDER — INSULIN GLARGINE 100 [IU]/ML
INJECTION, SOLUTION SUBCUTANEOUS
Qty: 6 ML | Refills: 4 | Status: SHIPPED | OUTPATIENT
Start: 2018-05-07 | End: 2018-10-09

## 2018-05-08 ENCOUNTER — LAB ENCOUNTER (OUTPATIENT)
Dept: LAB | Facility: HOSPITAL | Age: 48
End: 2018-05-08
Attending: INTERNAL MEDICINE
Payer: MEDICAID

## 2018-05-08 DIAGNOSIS — E11.9 TYPE 2 DIABETES MELLITUS WITHOUT COMPLICATION, WITHOUT LONG-TERM CURRENT USE OF INSULIN (HCC): ICD-10-CM

## 2018-05-08 DIAGNOSIS — E78.00 PURE HYPERCHOLESTEROLEMIA: ICD-10-CM

## 2018-05-08 PROCEDURE — 85025 COMPLETE CBC W/AUTO DIFF WBC: CPT

## 2018-05-08 PROCEDURE — 80053 COMPREHEN METABOLIC PANEL: CPT

## 2018-05-08 PROCEDURE — 83036 HEMOGLOBIN GLYCOSYLATED A1C: CPT

## 2018-05-08 PROCEDURE — 36415 COLL VENOUS BLD VENIPUNCTURE: CPT

## 2018-05-08 PROCEDURE — 80061 LIPID PANEL: CPT

## 2018-05-09 ENCOUNTER — OFFICE VISIT (OUTPATIENT)
Dept: INTERNAL MEDICINE CLINIC | Facility: CLINIC | Age: 48
End: 2018-05-09

## 2018-05-09 VITALS
WEIGHT: 216 LBS | RESPIRATION RATE: 20 BRPM | HEART RATE: 91 BPM | TEMPERATURE: 98 F | SYSTOLIC BLOOD PRESSURE: 117 MMHG | BODY MASS INDEX: 36 KG/M2 | DIASTOLIC BLOOD PRESSURE: 72 MMHG

## 2018-05-09 DIAGNOSIS — G89.29 CHRONIC MIDLINE BACK PAIN, UNSPECIFIED BACK LOCATION: ICD-10-CM

## 2018-05-09 DIAGNOSIS — M54.9 CHRONIC MIDLINE BACK PAIN, UNSPECIFIED BACK LOCATION: ICD-10-CM

## 2018-05-09 DIAGNOSIS — I10 ESSENTIAL HYPERTENSION: ICD-10-CM

## 2018-05-09 DIAGNOSIS — E11.9 TYPE 2 DIABETES MELLITUS WITHOUT COMPLICATION, WITH LONG-TERM CURRENT USE OF INSULIN (HCC): Primary | ICD-10-CM

## 2018-05-09 DIAGNOSIS — J45.20 MILD INTERMITTENT ASTHMA WITHOUT COMPLICATION: ICD-10-CM

## 2018-05-09 DIAGNOSIS — Z79.4 TYPE 2 DIABETES MELLITUS WITHOUT COMPLICATION, WITH LONG-TERM CURRENT USE OF INSULIN (HCC): Primary | ICD-10-CM

## 2018-05-09 PROCEDURE — 99214 OFFICE O/P EST MOD 30 MIN: CPT | Performed by: INTERNAL MEDICINE

## 2018-05-09 PROCEDURE — 99212 OFFICE O/P EST SF 10 MIN: CPT | Performed by: INTERNAL MEDICINE

## 2018-05-10 NOTE — PROGRESS NOTES
HPI:    Patient ID: Francisco Steve is a 52year old female. Presents for follow-up of multiple medical conditions    HPI  Patient is here accompanied by a boyfriend.   She is happy to report that she has been working for about 2 weeks now s Pen-injector inject 20 units by subcutaneous route every evening Disp: 6 mL Rfl: 4   AMLODIPINE BESYLATE 10 MG Oral Tab TAKE 1 TABLET BY MOUTH ONCE A DAY  Disp: 30 tablet Rfl: 6   loratadine (CLARITIN) 10 MG Oral Tab Take 1 tablet (10 mg total) by mouth da (Oral)   Resp 20   Wt 216 lb (98 kg)   BMI 35.94 kg/m²    Physical Exam    Constitutional: She is oriented to person, place, and time and obese. She appears well-developed and well-nourished. HENT:   Head: Normocephalic and atraumatic.    Right Ear: Tympa Referrals:  None         ID#[id number]

## 2018-05-30 ENCOUNTER — HOSPITAL ENCOUNTER (OUTPATIENT)
Age: 48
Discharge: OTHER INSTITUTION | End: 2018-05-30
Payer: MEDICAID

## 2018-05-30 VITALS
DIASTOLIC BLOOD PRESSURE: 88 MMHG | TEMPERATURE: 98 F | HEART RATE: 107 BPM | SYSTOLIC BLOOD PRESSURE: 150 MMHG | OXYGEN SATURATION: 98 % | BODY MASS INDEX: 35 KG/M2 | WEIGHT: 210 LBS | RESPIRATION RATE: 20 BRPM

## 2018-05-30 DIAGNOSIS — R42 DIZZINESS: ICD-10-CM

## 2018-05-30 DIAGNOSIS — M79.604 PAIN OF RIGHT LOWER EXTREMITY: Primary | ICD-10-CM

## 2018-05-30 DIAGNOSIS — S80.11XA CONTUSION OF RIGHT LOWER EXTREMITY, INITIAL ENCOUNTER: ICD-10-CM

## 2018-05-30 PROCEDURE — 99212 OFFICE O/P EST SF 10 MIN: CPT

## 2018-05-30 PROCEDURE — 82962 GLUCOSE BLOOD TEST: CPT
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
## 2018-05-30 NOTE — ED PROVIDER NOTES
Patient presents with:  Swelling Edema (cardiovascular, metabolic)      HPI:     Cherrie Mckeon is a 50year old female who presents today with a chief complaint of a bruise to the right upper lateral leg that she states started 4 days ago. Day Smoker  1.00 Packs/day  For 22.00 Years     Types: Cigarettes    Smokeless tobacco: Never Used    Comment: 0.5 units per day    Alcohol use No    Drug use: No    Sexual activity: Yes    Partners: Male     Other Topics Concern    Caffeine Concern Yes AccucheJamming    Labs performed this visit:    Recent Results (from the past 10 hour(s))  -POCT GLUCOSE   Collection Time: 05/30/18  4:57 PM   Result Value Ref Range   POC Glucose  176 (H) 70 - 99       MDM:  The patient's Accu-Chek is 176.   She states she wou

## 2018-05-30 NOTE — ED NOTES
Patient directed to go to the Emergency Room for further evaluation. Patient states she will be going to [facility] ER.

## 2018-05-31 ENCOUNTER — TELEPHONE (OUTPATIENT)
Dept: INTERNAL MEDICINE CLINIC | Facility: CLINIC | Age: 48
End: 2018-05-31

## 2018-05-31 DIAGNOSIS — Z79.4 TYPE 2 DIABETES MELLITUS WITHOUT COMPLICATION, WITH LONG-TERM CURRENT USE OF INSULIN (HCC): Primary | ICD-10-CM

## 2018-05-31 DIAGNOSIS — E11.9 TYPE 2 DIABETES MELLITUS WITHOUT COMPLICATION, WITH LONG-TERM CURRENT USE OF INSULIN (HCC): Primary | ICD-10-CM

## 2018-05-31 NOTE — TELEPHONE ENCOUNTER
Pt is asking if pcp would like squeeze her in for an [facility] f/u  Pt can only come in on Tuesday night   Pt was [facility] the [facility]  Pt asking to have pcp contact her

## 2018-06-01 NOTE — TELEPHONE ENCOUNTER
Spoke with pt. Pt. States she need to make a f/u with pt. nk for ER visit preferred to be seen on Tuesday  after 6:30pm.Pt. Informed that next week Tuesday  Schedule after 6:30 pm is booked. Offered other days to make appointment declined due to transportation problem. Pt. Requesting to ask NK if she can be add on the schedule. Note: Per NK`s schedule , already double booked  Please advise.

## 2018-06-01 NOTE — TELEPHONE ENCOUNTER
I cannot see patient this coming Tuesday time already double book, we can try to double book you Monday at 4-40 OR Wednesday 4;40 and she has option to see any other physician at the clinic this evening hours this coming week, she should bring discharge papers from ER visit

## 2018-06-05 NOTE — TELEPHONE ENCOUNTER
I can see patient next Tuesday, 6/12/2018 at 6;20 there is opening no in that slot, Marion will call patient

## 2018-06-05 NOTE — TELEPHONE ENCOUNTER
Pt called back and states she can not come in on Wednesday   I informed pt she is able to see any Dr here in the clinic, pt did not believe me and kept telling me her insurance said she can only see Kayla Connors   Pt is now asking if she can come in the 12th between 6 and 7 pm. I told pt Dr was booked that evening   Again I offered another provider and pt was not listening to what I was trying to tell her

## 2018-06-12 ENCOUNTER — OFFICE VISIT (OUTPATIENT)
Dept: INTERNAL MEDICINE CLINIC | Facility: CLINIC | Age: 48
End: 2018-06-12

## 2018-06-12 VITALS
WEIGHT: 212 LBS | DIASTOLIC BLOOD PRESSURE: 75 MMHG | HEART RATE: 90 BPM | BODY MASS INDEX: 35 KG/M2 | SYSTOLIC BLOOD PRESSURE: 130 MMHG | TEMPERATURE: 98 F | RESPIRATION RATE: 24 BRPM

## 2018-06-12 DIAGNOSIS — M54.16 LUMBAR RADICULOPATHY: ICD-10-CM

## 2018-06-12 DIAGNOSIS — S70.11XS: Primary | ICD-10-CM

## 2018-06-12 DIAGNOSIS — S70.01XS: Primary | ICD-10-CM

## 2018-06-12 DIAGNOSIS — B34.9 VIRAL SYNDROME: ICD-10-CM

## 2018-06-12 PROCEDURE — 99212 OFFICE O/P EST SF 10 MIN: CPT | Performed by: INTERNAL MEDICINE

## 2018-06-12 PROCEDURE — 99213 OFFICE O/P EST LOW 20 MIN: CPT | Performed by: INTERNAL MEDICINE

## 2018-06-12 RX ORDER — CYCLOBENZAPRINE HCL 10 MG
10 TABLET ORAL 3 TIMES DAILY
Qty: 30 TABLET | Refills: 0 | Status: SHIPPED | OUTPATIENT
Start: 2018-06-12 | End: 2018-12-18

## 2018-06-13 NOTE — PROGRESS NOTES
HPI:    Patient ID: Lauryn Alcazar is a 50year old female.   Presents for follow-up on the right hip pain, cold symptoms    HPI  Patient reports that about 4 weeks ago at work she works as a  at the store she was injured by a shoppin UNIT/ML Subcutaneous Solution Pen-injector inject 20 units by subcutaneous route every evening Disp: 6 mL Rfl: 4   AMLODIPINE BESYLATE 10 MG Oral Tab TAKE 1 TABLET BY MOUTH ONCE A DAY  Disp: 30 tablet Rfl: 6   loratadine (CLARITIN) 10 MG Oral Tab Take 1 ta Tylenol Sinus Max          /75 (BP Location: Right arm, Patient Position: Sitting, Cuff Size: adult)   Pulse 90   Temp 98 °F (36.7 °C) (Oral)   Resp 24   Wt 212 lb (96.2 kg)   BMI 35.28 kg/m²    Physical Exam    Constitutional: She is oriented to p at this point, follow-up if not improved in 7-10 days    No orders of the defined types were placed in this encounter.       Meds This Visit:  No prescriptions requested or ordered in this encounter    Imaging & Referrals:  None         #[phone]

## 2018-06-19 RX ORDER — MOMETASONE FUROATE AND FORMOTEROL FUMARATE DIHYDRATE 200; 5 UG/1; UG/1
AEROSOL RESPIRATORY (INHALATION)
Qty: 13 G | Refills: 5 | Status: SHIPPED | OUTPATIENT
Start: 2018-06-19 | End: 2018-10-09

## 2018-06-19 RX ORDER — MONTELUKAST SODIUM 10 MG/1
TABLET ORAL
Qty: 30 TABLET | Refills: 5 | Status: SHIPPED | OUTPATIENT
Start: 2018-06-19 | End: 2019-03-30

## 2018-06-20 ENCOUNTER — TELEPHONE (OUTPATIENT)
Dept: OTHER | Age: 48
End: 2018-06-20

## 2018-06-25 ENCOUNTER — TELEPHONE (OUTPATIENT)
Dept: INTERNAL MEDICINE CLINIC | Facility: CLINIC | Age: 48
End: 2018-06-25

## 2018-06-25 RX ORDER — BUDESONIDE AND FORMOTEROL FUMARATE DIHYDRATE 160; 4.5 UG/1; UG/1
2 AEROSOL RESPIRATORY (INHALATION) 2 TIMES DAILY
Qty: 1 INHALER | Refills: 6 | Status: SHIPPED | OUTPATIENT
Start: 2018-06-25 | End: 2018-10-09

## 2018-06-27 ENCOUNTER — TELEPHONE (OUTPATIENT)
Dept: INTERNAL MEDICINE CLINIC | Facility: CLINIC | Age: 48
End: 2018-06-27

## 2018-06-27 NOTE — TELEPHONE ENCOUNTER
PA for Symbicort 160-4.5 mcg/act inhaler completed with [employer] via CMM response time 3-5 business days [address]

## 2018-06-28 ENCOUNTER — TELEPHONE (OUTPATIENT)
Dept: INTERNAL MEDICINE CLINIC | Facility: CLINIC | Age: 48
End: 2018-06-28

## 2018-06-28 ENCOUNTER — NURSE TRIAGE (OUTPATIENT)
Dept: INTERNAL MEDICINE CLINIC | Facility: CLINIC | Age: 48
End: 2018-06-28

## 2018-06-28 RX ORDER — AMOXICILLIN AND CLAVULANATE POTASSIUM 875; 125 MG/1; MG/1
1 TABLET, FILM COATED ORAL 2 TIMES DAILY
Qty: 20 TABLET | Refills: 0 | Status: SHIPPED | OUTPATIENT
Start: 2018-06-28 | End: 2018-10-09

## 2018-06-28 NOTE — TELEPHONE ENCOUNTER
Action Requested: Summary for Provider     []  Critical Lab, Recommendations Needed  [x] Need Additional Advice  [x]   FYI    []   Need Orders  [] Need Medications Sent to Pharmacy  []  Other     SUMMARY: Pt called states past 2-3 days she has develop sore

## 2018-06-28 NOTE — TELEPHONE ENCOUNTER
Left message for pt  That Naeem sent to pharmacy , if not better needs to make appt or  Go to IC  Close to home

## 2018-06-29 NOTE — TELEPHONE ENCOUNTER
LMTCB, advised patient to stop Augmentin, stay on a bland diet keep up with fluids, if diarrhea is uncontrollable she is diabetic and cannot eat enough and sugar is uncontrolled she should go to emergency room for evaluation and supportive care.

## 2018-06-29 NOTE — TELEPHONE ENCOUNTER
Please note/advsie. Thank you.  Please reply to pool: EM RN TRIAGE    Pt contacted (Name and  verified) and states that she started the Augmentin last night at 6pm. Pt states that she started having diarrhea last night and is not sure if it is related to

## 2018-06-29 NOTE — TELEPHONE ENCOUNTER
Pt contacted (Name and  verified) and confirmed that she received the message as below. Pt verbalizes understanding but voiced some reluctance about going to the ED.      Reinforced importance of maintaining her health and safety and getting care as need

## 2018-07-11 ENCOUNTER — TELEPHONE (OUTPATIENT)
Dept: INTERNAL MEDICINE CLINIC | Facility: CLINIC | Age: 48
End: 2018-07-11

## 2018-07-11 RX ORDER — FLUTICASONE PROPIONATE AND SALMETEROL 113; 14 UG/1; UG/1
2 POWDER, METERED RESPIRATORY (INHALATION) 2 TIMES DAILY
Qty: 1 EACH | Refills: 6 | Status: SHIPPED | OUTPATIENT
Start: 2018-07-11

## 2018-07-11 NOTE — TELEPHONE ENCOUNTER
Left message for the patient that I faxed prescription for Airduo generic inhaler for Advair, to  be used 2 inhalations every 12 hours, asked patient to report if medication is not effective

## 2018-08-06 ENCOUNTER — LAB ENCOUNTER (OUTPATIENT)
Dept: LAB | Age: 48
End: 2018-08-06
Attending: INTERNAL MEDICINE
Payer: MEDICAID

## 2018-08-06 DIAGNOSIS — Z79.4 TYPE 2 DIABETES MELLITUS WITHOUT COMPLICATION, WITH LONG-TERM CURRENT USE OF INSULIN (HCC): ICD-10-CM

## 2018-08-06 DIAGNOSIS — E11.9 TYPE 2 DIABETES MELLITUS WITHOUT COMPLICATION, WITH LONG-TERM CURRENT USE OF INSULIN (HCC): ICD-10-CM

## 2018-08-06 LAB
ALBUMIN SERPL BCP-MCNC: 3.6 G/DL (ref 3.5–4.8)
ALBUMIN/GLOB SERPL: 1.1 {RATIO} (ref 1–2)
ALP SERPL-CCNC: 55 U/L (ref 32–100)
ALT SERPL-CCNC: 45 U/L (ref 14–54)
ANION GAP SERPL CALC-SCNC: 7 MMOL/L (ref 0–18)
AST SERPL-CCNC: 43 U/L (ref 15–41)
BASOPHILS # BLD: 0 K/UL (ref 0–0.2)
BASOPHILS NFR BLD: 1 %
BILIRUB SERPL-MCNC: 0.9 MG/DL (ref 0.3–1.2)
BUN SERPL-MCNC: 7 MG/DL (ref 8–20)
BUN/CREAT SERPL: 10.8 (ref 10–20)
CALCIUM SERPL-MCNC: 8.7 MG/DL (ref 8.5–10.5)
CHLORIDE SERPL-SCNC: 99 MMOL/L (ref 95–110)
CHOLEST SERPL-MCNC: 201 MG/DL (ref 110–200)
CO2 SERPL-SCNC: 24 MMOL/L (ref 22–32)
CREAT SERPL-MCNC: 0.65 MG/DL (ref 0.5–1.5)
EOSINOPHIL # BLD: 0.1 K/UL (ref 0–0.7)
EOSINOPHIL NFR BLD: 1 %
ERYTHROCYTE [DISTWIDTH] IN BLOOD BY AUTOMATED COUNT: 12.7 % (ref 11–15)
GLOBULIN PLAS-MCNC: 3.2 G/DL (ref 2.5–3.7)
GLUCOSE SERPL-MCNC: 188 MG/DL (ref 70–99)
HCT VFR BLD AUTO: 44.2 % (ref 35–48)
HDLC SERPL-MCNC: 44 MG/DL
HGB BLD-MCNC: 15.3 G/DL (ref 12–16)
LDLC SERPL CALC-MCNC: 129 MG/DL (ref 0–99)
LYMPHOCYTES # BLD: 2.2 K/UL (ref 1–4)
LYMPHOCYTES NFR BLD: 35 %
MCH RBC QN AUTO: 34 PG (ref 27–32)
MCHC RBC AUTO-ENTMCNC: 34.6 G/DL (ref 32–37)
MCV RBC AUTO: 98.2 FL (ref 80–100)
MONOCYTES # BLD: 0.4 K/UL (ref 0–1)
MONOCYTES NFR BLD: 7 %
NEUTROPHILS # BLD AUTO: 3.5 K/UL (ref 1.8–7.7)
NEUTROPHILS NFR BLD: 56 %
NONHDLC SERPL-MCNC: 157 MG/DL
OSMOLALITY UR CALC.SUM OF ELEC: 273 MOSM/KG (ref 275–295)
PATIENT FASTING: YES
PLATELET # BLD AUTO: 237 K/UL (ref 140–400)
PMV BLD AUTO: 9.1 FL (ref 7.4–10.3)
POTASSIUM SERPL-SCNC: 3.8 MMOL/L (ref 3.3–5.1)
PROT SERPL-MCNC: 6.8 G/DL (ref 5.9–8.4)
RBC # BLD AUTO: 4.5 M/UL (ref 3.7–5.4)
SODIUM SERPL-SCNC: 130 MMOL/L (ref 136–144)
TRIGL SERPL-MCNC: 141 MG/DL (ref 1–149)
WBC # BLD AUTO: 6.2 K/UL (ref 4–11)

## 2018-08-06 PROCEDURE — 80061 LIPID PANEL: CPT

## 2018-08-06 PROCEDURE — 36415 COLL VENOUS BLD VENIPUNCTURE: CPT

## 2018-08-06 PROCEDURE — 80053 COMPREHEN METABOLIC PANEL: CPT

## 2018-08-06 PROCEDURE — 83036 HEMOGLOBIN GLYCOSYLATED A1C: CPT

## 2018-08-06 PROCEDURE — 85025 COMPLETE CBC W/AUTO DIFF WBC: CPT

## 2018-08-07 ENCOUNTER — OFFICE VISIT (OUTPATIENT)
Dept: INTERNAL MEDICINE CLINIC | Facility: CLINIC | Age: 48
End: 2018-08-07
Payer: MEDICAID

## 2018-08-07 VITALS
WEIGHT: 207 LBS | HEIGHT: 65 IN | BODY MASS INDEX: 34.49 KG/M2 | HEART RATE: 91 BPM | TEMPERATURE: 98 F | SYSTOLIC BLOOD PRESSURE: 133 MMHG | DIASTOLIC BLOOD PRESSURE: 81 MMHG

## 2018-08-07 DIAGNOSIS — E78.5 HYPERLIPIDEMIA LDL GOAL <70: ICD-10-CM

## 2018-08-07 DIAGNOSIS — E08.29 DIABETES MELLITUS DUE TO UNDERLYING CONDITION WITH MICROALBUMINURIA, WITH LONG-TERM CURRENT USE OF INSULIN (HCC): Primary | ICD-10-CM

## 2018-08-07 DIAGNOSIS — I10 ESSENTIAL HYPERTENSION: ICD-10-CM

## 2018-08-07 DIAGNOSIS — Z79.4 DIABETES MELLITUS DUE TO UNDERLYING CONDITION WITH MICROALBUMINURIA, WITH LONG-TERM CURRENT USE OF INSULIN (HCC): Primary | ICD-10-CM

## 2018-08-07 DIAGNOSIS — R80.9 DIABETES MELLITUS DUE TO UNDERLYING CONDITION WITH MICROALBUMINURIA, WITH LONG-TERM CURRENT USE OF INSULIN (HCC): Primary | ICD-10-CM

## 2018-08-07 LAB — HBA1C MFR BLD: 9.5 % (ref 4–6)

## 2018-08-07 PROCEDURE — 99214 OFFICE O/P EST MOD 30 MIN: CPT | Performed by: INTERNAL MEDICINE

## 2018-08-07 PROCEDURE — 99212 OFFICE O/P EST SF 10 MIN: CPT | Performed by: INTERNAL MEDICINE

## 2018-08-07 RX ORDER — ATORVASTATIN CALCIUM 10 MG/1
10 TABLET, FILM COATED ORAL NIGHTLY
Qty: 90 TABLET | Refills: 0 | Status: SHIPPED | OUTPATIENT
Start: 2018-08-07 | End: 2018-11-09

## 2018-08-08 NOTE — PROGRESS NOTES
HPI:    Patient ID: Jonny Pace is a 50year old female presents for follow-up of diabetes, hypertension hyperlipidemia  HPI  Patient reports that she continues to work part-time, comes home with painful muscles, continues to complain of mouth 3 (three) times daily.  Disp: 30 tablet Rfl: 0   BASAGLAR KWIKPEN 100 UNIT/ML Subcutaneous Solution Pen-injector inject 20 units by subcutaneous route every evening Disp: 6 mL Rfl: 4   AMLODIPINE BESYLATE 10 MG Oral Tab TAKE 1 TABLET BY MOUTH ONCE A D microalbuminuria, with long-term current use of insulin (hcc)  (primary encounter diagnosis)  Essential hypertension  Hyperlipidemia ldl goal <70      Orders Placed This Encounter      CBC W Differential W Platelet [E]      Comp Metabolic Panel (14) [E]

## 2018-08-09 ENCOUNTER — TELEPHONE (OUTPATIENT)
Dept: INTERNAL MEDICINE CLINIC | Facility: CLINIC | Age: 48
End: 2018-08-09

## 2018-08-13 ENCOUNTER — TELEPHONE (OUTPATIENT)
Dept: INTERNAL MEDICINE CLINIC | Facility: CLINIC | Age: 48
End: 2018-08-13

## 2018-08-13 NOTE — TELEPHONE ENCOUNTER
PA for Victoza 18 mg/3 ml subcutaneous solution pen injector completed with [employer] via CMM response time 3-5 business days KEY [id number].

## 2018-08-23 NOTE — TELEPHONE ENCOUNTER
Patient is failing metformin and glipizide, she is already receiving basal Lantus insulin, blood sugar is not controlled and I recommend that patient  adds Victoza to her regimen, please let me know if I have to speak to a person who approves medications

## 2018-08-23 NOTE — TELEPHONE ENCOUNTER
According to the denial patient must have tried and failed those medications within the past 90 days. You can write a letter of medical necessity and I can send it to the plan. Please advise.

## 2018-08-23 NOTE — TELEPHONE ENCOUNTER
PA denied. Plan states patient must try and fail within the past 90 days, Metformin or Metformin in combination with another drug class  such as glyburide/metformin or glipizide/metformin.

## 2018-08-24 ENCOUNTER — TELEPHONE (OUTPATIENT)
Dept: INTERNAL MEDICINE CLINIC | Facility: CLINIC | Age: 48
End: 2018-08-24

## 2018-08-27 NOTE — TELEPHONE ENCOUNTER
PA approved effective 08/24/2018--08/24/2019, Victoza 18mg/3ml Solution Pen-Injector. Pharmacy notified.

## 2018-08-29 ENCOUNTER — TELEPHONE (OUTPATIENT)
Dept: INTERNAL MEDICINE CLINIC | Facility: CLINIC | Age: 48
End: 2018-08-29

## 2018-08-30 NOTE — TELEPHONE ENCOUNTER
Please fax to the pharmacy prescription for needles to match [address] [facility] PEN IF NEEDED  SEND 2  DIFFERENT  RX

## 2018-08-30 NOTE — TELEPHONE ENCOUNTER
Spoke to patient, she received Victoza, reviewed with her her blood sugar readings, last 2 weeks the running between 203 100, states that she is stressed with work.   She takes insulin BARSAGLAR 30-33 units every morning, I advised her that she should decre

## 2018-09-05 RX ORDER — SYRINGE-NEEDLE,INSULIN,0.5 ML 27GX1/2"
SYRINGE, EMPTY DISPOSABLE MISCELLANEOUS
COMMUNITY
End: 2018-09-05

## 2018-09-05 RX ORDER — SYRINGE-NEEDLE,INSULIN,0.5 ML 27GX1/2"
SYRINGE, EMPTY DISPOSABLE MISCELLANEOUS
Qty: 100 EACH | Refills: 6 | Status: SHIPPED | OUTPATIENT
Start: 2018-09-05

## 2018-09-05 NOTE — ADDENDUM NOTE
Addended byLee Ann Morelos on: 9/5/2018 03:53 PM     Modules accepted: Orders I spoke with Janny and informed her of message below. She verbalized understanding and will await response from therapist regarding everything.

## 2018-09-07 RX ORDER — PEN NEEDLE, DIABETIC 30 GX3/16"
1 NEEDLE, DISPOSABLE MISCELLANEOUS AS NEEDED
Qty: 100 EACH | Refills: 6 | Status: SHIPPED | OUTPATIENT
Start: 2018-09-07 | End: 2020-10-19

## 2018-09-16 RX ORDER — GLIPIZIDE 5 MG/1
TABLET, FILM COATED, EXTENDED RELEASE ORAL
Qty: 90 TABLET | Refills: 2 | Status: SHIPPED | OUTPATIENT
Start: 2018-09-16 | End: 2019-05-04

## 2018-10-08 ENCOUNTER — TELEPHONE (OUTPATIENT)
Dept: OTHER | Age: 48
End: 2018-10-08

## 2018-10-08 ENCOUNTER — TELEPHONE (OUTPATIENT)
Dept: INTERNAL MEDICINE CLINIC | Facility: CLINIC | Age: 48
End: 2018-10-08

## 2018-10-08 DIAGNOSIS — R50.9 FEVER, UNSPECIFIED FEVER CAUSE: Primary | ICD-10-CM

## 2018-10-08 RX ORDER — CODEINE PHOSPHATE AND GUAIFENESIN 10; 100 MG/5ML; MG/5ML
5 SOLUTION ORAL 4 TIMES DAILY PRN
Qty: 240 ML | Refills: 0 | Status: CANCELLED | OUTPATIENT
Start: 2018-10-08

## 2018-10-08 NOTE — TELEPHONE ENCOUNTER
Action Requested: Summary for Provider     []  Critical Lab, Recommendations Needed  [] Need Additional Advice  []   FYI    []   Need Orders  [] Need Medications Sent to Pharmacy  []  Other     SUMMARY: OV scheduled for tomorrow for upper respiratory sympt

## 2018-10-09 ENCOUNTER — OFFICE VISIT (OUTPATIENT)
Dept: INTERNAL MEDICINE CLINIC | Facility: CLINIC | Age: 48
End: 2018-10-09
Payer: MEDICAID

## 2018-10-09 ENCOUNTER — LAB ENCOUNTER (OUTPATIENT)
Dept: LAB | Age: 48
End: 2018-10-09
Attending: INTERNAL MEDICINE
Payer: MEDICAID

## 2018-10-09 VITALS
SYSTOLIC BLOOD PRESSURE: 137 MMHG | DIASTOLIC BLOOD PRESSURE: 69 MMHG | BODY MASS INDEX: 34 KG/M2 | RESPIRATION RATE: 22 BRPM | WEIGHT: 202 LBS | HEART RATE: 98 BPM | TEMPERATURE: 98 F

## 2018-10-09 DIAGNOSIS — R50.9 FEVER, UNSPECIFIED FEVER CAUSE: ICD-10-CM

## 2018-10-09 DIAGNOSIS — B34.9 VIRAL SYNDROME: ICD-10-CM

## 2018-10-09 DIAGNOSIS — H81.10 BENIGN PAROXYSMAL VERTIGO, UNSPECIFIED LATERALITY: ICD-10-CM

## 2018-10-09 DIAGNOSIS — J01.11 ACUTE RECURRENT FRONTAL SINUSITIS: Primary | ICD-10-CM

## 2018-10-09 PROCEDURE — 99212 OFFICE O/P EST SF 10 MIN: CPT | Performed by: INTERNAL MEDICINE

## 2018-10-09 PROCEDURE — 85025 COMPLETE CBC W/AUTO DIFF WBC: CPT

## 2018-10-09 PROCEDURE — 80053 COMPREHEN METABOLIC PANEL: CPT

## 2018-10-09 PROCEDURE — 99214 OFFICE O/P EST MOD 30 MIN: CPT | Performed by: INTERNAL MEDICINE

## 2018-10-09 PROCEDURE — 36415 COLL VENOUS BLD VENIPUNCTURE: CPT

## 2018-10-09 PROCEDURE — 87086 URINE CULTURE/COLONY COUNT: CPT

## 2018-10-09 PROCEDURE — 81001 URINALYSIS AUTO W/SCOPE: CPT

## 2018-10-09 RX ORDER — AMOXICILLIN 875 MG/1
875 TABLET, COATED ORAL 2 TIMES DAILY
Qty: 1 TABLET | Refills: 0 | Status: SHIPPED | OUTPATIENT
Start: 2018-10-09 | End: 2018-10-10

## 2018-10-09 RX ORDER — INSULIN GLARGINE 100 [IU]/ML
INJECTION, SOLUTION SUBCUTANEOUS
Qty: 6 ML | Refills: 3 | Status: SHIPPED | OUTPATIENT
Start: 2018-10-09 | End: 2019-03-25

## 2018-10-10 ENCOUNTER — TELEPHONE (OUTPATIENT)
Dept: INTERNAL MEDICINE CLINIC | Facility: CLINIC | Age: 48
End: 2018-10-10

## 2018-10-10 RX ORDER — AMOXICILLIN 875 MG/1
TABLET, COATED ORAL
Qty: 14 TABLET | Refills: 0 | Status: SHIPPED | OUTPATIENT
Start: 2018-10-10 | End: 2018-10-16

## 2018-10-10 NOTE — TELEPHONE ENCOUNTER
Pharmacy stts quantity does not match directions. amoxicillin 875 MG Oral Tab 1 tablet 0 10/9/2018    Sig :  Take 1 tablet (875 mg total) by mouth 2 (two) times daily.      Route:   Oral     Order #: [id number]

## 2018-10-10 NOTE — PROGRESS NOTES
HPI:    Patient ID: Kevin Fonseca is a 50year old female.   Presents for evaluation of the headache, body aches and fatigue,, follow-up of diabetes    HPI  Patient reports that 10/29/2018 she started to feel sick, extreme fatigue and achin joint swelling and gait problem. Neurological: Positive for dizziness and headaches. Hematological: Negative for adenopathy. Does not bruise/bleed easily. Psychiatric/Behavioral: The patient is nervous/anxious.            Current Outpatient Medication Disp: 90 capsule Rfl: 0   RICKEY MICROLET LANCETS Does not apply Misc Test blood glucose 3 times daily. Disp: 100 each Rfl: 0   Amphetamine-Dextroamphet ER 20 MG Oral Capsule SR 24 Hr Take 20 mg by mouth every morning.  Disp:  Rfl:    VENTOLIN  (90 BA alert and oriented to person, place, and time. No cranial nerve deficit or motor deficit. Coordination abnormal. Gait normal.   Psychiatric: She has a normal mood and affect.  Her behavior is normal. Judgment normal.          ASSESSMENT/PLAN:   Acute recurr

## 2018-10-12 NOTE — TELEPHONE ENCOUNTER
Patient calling and states that Dr Grzegorz Acostaly told her to  Call us back to give updates.  States that  Today is the 3rd day and she still feeling very tired, weak, nasal and chest congestion, mild sob, sore throat, headache, feeling warm, no appetite, hard to t

## 2018-10-15 NOTE — TELEPHONE ENCOUNTER
Spoke to patient 10/13/2018, she will follow-up tomorrow on Tuesday will finish antibiotics, will need to extension of the note,

## 2018-10-16 ENCOUNTER — OFFICE VISIT (OUTPATIENT)
Dept: INTERNAL MEDICINE CLINIC | Facility: CLINIC | Age: 48
End: 2018-10-16
Payer: MEDICAID

## 2018-10-16 VITALS
DIASTOLIC BLOOD PRESSURE: 81 MMHG | BODY MASS INDEX: 34 KG/M2 | SYSTOLIC BLOOD PRESSURE: 128 MMHG | HEART RATE: 97 BPM | TEMPERATURE: 98 F | RESPIRATION RATE: 24 BRPM | WEIGHT: 206 LBS

## 2018-10-16 DIAGNOSIS — R53.81 MALAISE: ICD-10-CM

## 2018-10-16 DIAGNOSIS — R05.9 COUGH: Primary | ICD-10-CM

## 2018-10-16 DIAGNOSIS — J01.81 OTHER ACUTE RECURRENT SINUSITIS: ICD-10-CM

## 2018-10-16 DIAGNOSIS — R51.9 HEADACHE DISORDER: ICD-10-CM

## 2018-10-16 PROCEDURE — 99213 OFFICE O/P EST LOW 20 MIN: CPT | Performed by: INTERNAL MEDICINE

## 2018-10-16 PROCEDURE — 99212 OFFICE O/P EST SF 10 MIN: CPT | Performed by: INTERNAL MEDICINE

## 2018-10-19 RX ORDER — BLOOD SUGAR DIAGNOSTIC
STRIP MISCELLANEOUS
Qty: 50 EACH | Refills: 0 | Status: SHIPPED | OUTPATIENT
Start: 2018-10-19 | End: 2018-11-09

## 2018-10-19 NOTE — TELEPHONE ENCOUNTER
Refill Protocol Appointment Criteria  · Appointment scheduled in the past 12 months or in the next 3 months  Recent Outpatient Visits            3 days ago Cough    Amado Kaur MD    Office Visit    1 week ago Acute

## 2018-10-21 NOTE — PROGRESS NOTES
HPI:    Patient ID: Erika Madera is a 50year old female.   Presents for follow-up on fatigue    HPI  Patient reports that she continues to experience profound fatigue now it is more than a month, spends a lot of time laying in bed being o Rfl: 0   Fluticasone-Salmeterol (AIRDUO RESPICLICK 739/44) 159-40 MCG/ACT Inhalation Aerosol Powder, Breath Activated Inhale 2 puffs into the lungs 2 (two) times daily.  Disp: 1 each Rfl: 6   MONTELUKAST SODIUM 10 MG Oral Tab TAKE ONE TABLET BY MOUTH NIGHTL (93.4 kg)   BMI 34.28 kg/m²    Physical Exam    Constitutional: She is oriented to person, place, and time. She appears well-developed and well-nourished. No distress. HENT:   Head: Normocephalic and atraumatic.    Mouth/Throat: Oropharynx is clear and mo

## 2018-10-23 ENCOUNTER — LAB ENCOUNTER (OUTPATIENT)
Dept: LAB | Facility: HOSPITAL | Age: 48
End: 2018-10-23
Attending: INTERNAL MEDICINE
Payer: MEDICAID

## 2018-10-23 ENCOUNTER — TELEPHONE (OUTPATIENT)
Dept: OTHER | Age: 48
End: 2018-10-23

## 2018-10-23 ENCOUNTER — HOSPITAL ENCOUNTER (OUTPATIENT)
Dept: GENERAL RADIOLOGY | Facility: HOSPITAL | Age: 48
Discharge: HOME | End: 2018-10-23
Attending: INTERNAL MEDICINE
Payer: MEDICAID

## 2018-10-23 DIAGNOSIS — R51.9 HEADACHE DISORDER: ICD-10-CM

## 2018-10-23 DIAGNOSIS — R05.9 COUGH: ICD-10-CM

## 2018-10-23 DIAGNOSIS — R53.81 MALAISE: ICD-10-CM

## 2018-10-23 PROCEDURE — 86038 ANTINUCLEAR ANTIBODIES: CPT

## 2018-10-23 PROCEDURE — 82550 ASSAY OF CK (CPK): CPT

## 2018-10-23 PROCEDURE — 36415 COLL VENOUS BLD VENIPUNCTURE: CPT

## 2018-10-23 PROCEDURE — 84443 ASSAY THYROID STIM HORMONE: CPT

## 2018-10-23 PROCEDURE — 86140 C-REACTIVE PROTEIN: CPT

## 2018-10-23 PROCEDURE — 85652 RBC SED RATE AUTOMATED: CPT

## 2018-10-23 PROCEDURE — 71046 X-RAY EXAM CHEST 2 VIEWS: CPT | Performed by: INTERNAL MEDICINE

## 2018-10-23 PROCEDURE — 86225 DNA ANTIBODY NATIVE: CPT

## 2018-10-23 PROCEDURE — 85025 COMPLETE CBC W/AUTO DIFF WBC: CPT

## 2018-10-23 NOTE — TELEPHONE ENCOUNTER
Spoke with patient--asking if labs ordered by NK 10/16/18 are fasting. Spoke with Alison in lab--reports none of the tests are fasting.     LM for patient per her request--no need to fast for labs and no need to schedule CXR--left [facility] lab/x-ray hours of

## 2018-10-25 ENCOUNTER — TELEPHONE (OUTPATIENT)
Dept: INTERNAL MEDICINE CLINIC | Facility: CLINIC | Age: 48
End: 2018-10-25

## 2018-10-25 ENCOUNTER — TELEPHONE (OUTPATIENT)
Dept: OTHER | Age: 48
End: 2018-10-25

## 2018-10-25 RX ORDER — PANTOPRAZOLE SODIUM 40 MG/1
40 TABLET, DELAYED RELEASE ORAL
Qty: 30 TABLET | Refills: 3 | Status: SHIPPED | OUTPATIENT
Start: 2018-10-25 | End: 2020-01-14

## 2018-10-25 RX ORDER — CODEINE PHOSPHATE AND GUAIFENESIN 10; 100 MG/5ML; MG/5ML
5 SOLUTION ORAL EVERY 6 HOURS PRN
Qty: 180 ML | Refills: 0 | OUTPATIENT
Start: 2018-10-25 | End: 2018-12-18

## 2018-10-25 RX ORDER — CEFUROXIME AXETIL 500 MG/1
500 TABLET ORAL 2 TIMES DAILY
Qty: 14 TABLET | Refills: 0 | Status: SHIPPED | OUTPATIENT
Start: 2018-10-25 | End: 2018-12-18

## 2018-10-25 NOTE — TELEPHONE ENCOUNTER
Spoke to patient reviewed blood test normal CBC CMP sed rate and C-reactive protein, abnormal chest x-ray shows signs of bronchitis.   Pending 2 blood test.  Patient reports that she woke up with more chest congestion today coughing more feels like she has

## 2018-10-25 NOTE — TELEPHONE ENCOUNTER
Pt returning  call. Per  she will call her back.      Notes recorded by Michelle Garcia MD on 10/25/2018 at 9:02 AM CDT  [email or web address]

## 2018-11-03 NOTE — TELEPHONE ENCOUNTER
Review pended refill request as it does not fall under a protocol.     Last Rx: 10/10/18   LOV: 10/9/18

## 2018-11-04 RX ORDER — ERGOCALCIFEROL 1.25 MG/1
CAPSULE ORAL
Qty: 4 CAPSULE | Refills: 2 | Status: SHIPPED | OUTPATIENT
Start: 2018-11-04 | End: 2019-05-04

## 2018-11-05 ENCOUNTER — TELEPHONE (OUTPATIENT)
Dept: INTERNAL MEDICINE CLINIC | Facility: CLINIC | Age: 48
End: 2018-11-05

## 2018-11-09 RX ORDER — ATORVASTATIN CALCIUM 10 MG/1
TABLET, FILM COATED ORAL
Qty: 30 TABLET | Refills: 0 | Status: SHIPPED | OUTPATIENT
Start: 2018-11-09 | End: 2018-12-18

## 2018-11-09 RX ORDER — PERPHENAZINE 16 MG/1
TABLET, FILM COATED ORAL
Qty: 50 EACH | Refills: 0 | Status: SHIPPED | OUTPATIENT
Start: 2018-11-09 | End: 2018-12-03

## 2018-11-27 RX ORDER — AMLODIPINE BESYLATE 10 MG/1
TABLET ORAL
Qty: 30 TABLET | Refills: 5 | Status: SHIPPED | OUTPATIENT
Start: 2018-11-27 | End: 2019-03-30

## 2018-12-03 RX ORDER — PERPHENAZINE 16 MG/1
TABLET, FILM COATED ORAL
Qty: 200 EACH | Refills: 3 | Status: SHIPPED | OUTPATIENT
Start: 2018-12-03 | End: 2019-04-13

## 2018-12-12 ENCOUNTER — TELEPHONE (OUTPATIENT)
Dept: INTERNAL MEDICINE CLINIC | Facility: CLINIC | Age: 48
End: 2018-12-12

## 2018-12-12 NOTE — TELEPHONE ENCOUNTER
Pt would like to see Dr. Layton Nuñez on 12-18-18 after 6:30 pm. There are no available appointments. Per pt this is for follow up on her diabetes. Pt states she has a bad work schedule and that is why she is requesting that time.  Per pt it is ok to leave a  Mes

## 2018-12-12 NOTE — TELEPHONE ENCOUNTER
Please give patient an appointment at ;[phone] on 12/18/2018, t patient's scheduled after 6:00 should be scheduled for 20 minutes apart they do not need 30-minute appointment

## 2018-12-18 ENCOUNTER — OFFICE VISIT (OUTPATIENT)
Dept: INTERNAL MEDICINE CLINIC | Facility: CLINIC | Age: 48
End: 2018-12-18
Payer: MEDICAID

## 2018-12-18 ENCOUNTER — LAB ENCOUNTER (OUTPATIENT)
Dept: LAB | Age: 48
End: 2018-12-18
Attending: INTERNAL MEDICINE
Payer: MEDICAID

## 2018-12-18 VITALS
BODY MASS INDEX: 35 KG/M2 | RESPIRATION RATE: 22 BRPM | TEMPERATURE: 98 F | DIASTOLIC BLOOD PRESSURE: 74 MMHG | HEART RATE: 86 BPM | WEIGHT: 209 LBS | SYSTOLIC BLOOD PRESSURE: 122 MMHG

## 2018-12-18 DIAGNOSIS — E78.5 HYPERLIPIDEMIA LDL GOAL <70: ICD-10-CM

## 2018-12-18 DIAGNOSIS — R80.9 DIABETES MELLITUS DUE TO UNDERLYING CONDITION WITH MICROALBUMINURIA, WITH LONG-TERM CURRENT USE OF INSULIN (HCC): ICD-10-CM

## 2018-12-18 DIAGNOSIS — Z79.4 TYPE 2 DIABETES MELLITUS WITHOUT COMPLICATION, WITH LONG-TERM CURRENT USE OF INSULIN (HCC): Primary | ICD-10-CM

## 2018-12-18 DIAGNOSIS — E11.9 TYPE 2 DIABETES MELLITUS WITHOUT COMPLICATION, WITH LONG-TERM CURRENT USE OF INSULIN (HCC): Primary | ICD-10-CM

## 2018-12-18 DIAGNOSIS — E08.29 DIABETES MELLITUS DUE TO UNDERLYING CONDITION WITH MICROALBUMINURIA, WITH LONG-TERM CURRENT USE OF INSULIN (HCC): ICD-10-CM

## 2018-12-18 DIAGNOSIS — J06.9 UPPER RESPIRATORY TRACT INFECTION, UNSPECIFIED TYPE: ICD-10-CM

## 2018-12-18 DIAGNOSIS — Z79.4 DIABETES MELLITUS DUE TO UNDERLYING CONDITION WITH MICROALBUMINURIA, WITH LONG-TERM CURRENT USE OF INSULIN (HCC): ICD-10-CM

## 2018-12-18 PROCEDURE — 99214 OFFICE O/P EST MOD 30 MIN: CPT | Performed by: INTERNAL MEDICINE

## 2018-12-18 PROCEDURE — 85025 COMPLETE CBC W/AUTO DIFF WBC: CPT

## 2018-12-18 PROCEDURE — 83036 HEMOGLOBIN GLYCOSYLATED A1C: CPT

## 2018-12-18 PROCEDURE — 99212 OFFICE O/P EST SF 10 MIN: CPT | Performed by: INTERNAL MEDICINE

## 2018-12-18 PROCEDURE — 82570 ASSAY OF URINE CREATININE: CPT

## 2018-12-18 PROCEDURE — 36415 COLL VENOUS BLD VENIPUNCTURE: CPT

## 2018-12-18 PROCEDURE — 82043 UR ALBUMIN QUANTITATIVE: CPT

## 2018-12-18 PROCEDURE — 82550 ASSAY OF CK (CPK): CPT

## 2018-12-18 PROCEDURE — 80061 LIPID PANEL: CPT

## 2018-12-18 PROCEDURE — 80053 COMPREHEN METABOLIC PANEL: CPT

## 2018-12-18 RX ORDER — CODEINE PHOSPHATE AND GUAIFENESIN 10; 100 MG/5ML; MG/5ML
5 SOLUTION ORAL EVERY 6 HOURS PRN
Qty: 180 ML | Refills: 0 | Status: SHIPPED | OUTPATIENT
Start: 2018-12-18 | End: 2019-03-26

## 2018-12-19 NOTE — PROGRESS NOTES
HPI:    Patient ID: Vivi Munson is a 50year old female.   Presents for follow-up on diabetes, hypertension, cold symptoms    HPI  Patient reports that for about 5 days she has not not been feeling well, she is experiencing postnasal of the knees by the end of the workday  Neurological:  Negative for gait disturbance, paresthesias  Psychiatric:  Negative for inappropriate interaction and psychiatric symptoms        Current Outpatient Medications:  guaiFENesin-codeine (CHERATUSSIN AC) 1 Rfl: 6   alprazolam (XANAX) 1 MG Oral Tab 4 (four) times daily as needed.    Disp:  Rfl: 2   CONTOUR NEXT TEST In Vitro Strip TEST BLOOD GLUCOSE THREE TIMES DAILY Disp: 200 each Rfl: 3   TERCONAZOLE 0.4 % Vaginal Cream PLACE 1 APPLICATOR VAGINALLY NIGHTLY Lymphadenopathy:     She has no cervical adenopathy. Neurological: She is alert and oriented to person, place, and time. No cranial nerve deficit, sensory deficit or motor deficit.  Gait normal.   Psychiatric: Her behavior is normal. Judgment normal.

## 2018-12-22 ENCOUNTER — TELEPHONE (OUTPATIENT)
Dept: INTERNAL MEDICINE CLINIC | Facility: CLINIC | Age: 48
End: 2018-12-22

## 2018-12-22 NOTE — TELEPHONE ENCOUNTER
Patient returning call from 12/20/18 requesting call back. Today or on Monday, when she is off work. She continues to experience cold symptoms, headache, sore throat, voice is hoarse. Fatigue.      She would like to discuss getting a letter for work to

## 2018-12-22 NOTE — TELEPHONE ENCOUNTER
Spoke to patient, she states that she will try to take symptomatic treatment, she speaks at work all day long that makes her voice hoarse, she will reported 3 4 days if she is not improving and may need antibiotic.   We discussed need for the normal, note m

## 2019-01-31 ENCOUNTER — TELEPHONE (OUTPATIENT)
Dept: INTERNAL MEDICINE CLINIC | Facility: CLINIC | Age: 49
End: 2019-01-31

## 2019-01-31 ENCOUNTER — NURSE TRIAGE (OUTPATIENT)
Dept: INTERNAL MEDICINE CLINIC | Facility: CLINIC | Age: 49
End: 2019-01-31

## 2019-01-31 RX ORDER — CEFUROXIME AXETIL 500 MG/1
500 TABLET ORAL 2 TIMES DAILY
Qty: 20 TABLET | Refills: 0 | Status: SHIPPED | OUTPATIENT
Start: 2019-01-31 | End: 2019-03-26

## 2019-01-31 RX ORDER — CYCLOBENZAPRINE HCL 10 MG
10 TABLET ORAL NIGHTLY
Qty: 30 TABLET | Refills: 0 | Status: SHIPPED | OUTPATIENT
Start: 2019-01-31 | End: 2019-03-22

## 2019-01-31 NOTE — TELEPHONE ENCOUNTER
Action Requested: Summary for Provider     []  Critical Lab, Recommendations Needed  [x] Need Additional Advice  []   FYI    []   Need Orders  [] Need Medications Sent to Pharmacy  []  Other     SUMMARY: Patient requesting a call back from Dr Maureen Fuentes to discuss

## 2019-01-31 NOTE — TELEPHONE ENCOUNTER
Spoke to patient, advised on taking cyclobenzaprine at bedtime for headache and stress and muscular tension that she has.   Also we will treat you with Ceftin antibiotic for 10 days, advised her to come for follow-up appointment, reminded to see Sparkle Mcgill

## 2019-02-02 NOTE — TELEPHONE ENCOUNTER
Patient called back and scheduled 2/16/19. Patient states she spoke to Dr Chad Morejon a few days ago. See MD note below.

## 2019-02-09 ENCOUNTER — TELEPHONE (OUTPATIENT)
Dept: INTERNAL MEDICINE CLINIC | Facility: CLINIC | Age: 49
End: 2019-02-09

## 2019-02-09 NOTE — TELEPHONE ENCOUNTER
Filemon Cruz message regarding:    Atorvastatin Calcium Oral Tablet 10MG  Take one by mouth nightly     Rx last filled on 11/9/18. Did patient discontinue med? If not, please authorize refill.

## 2019-02-14 NOTE — TELEPHONE ENCOUNTER
Review pended refill request as it does not fall under a protocol.     Requested Prescriptions     Pending Prescriptions Disp Refills   • TERCONAZOLE 0.4 % Vaginal Cream [Pharmacy Med Name: Terconazole Vaginal Cream 0.4 %] 45 g 0     Sig: PLACE 1 APPLICATOR

## 2019-02-18 RX ORDER — ATORVASTATIN CALCIUM 10 MG/1
TABLET, FILM COATED ORAL
Qty: 30 TABLET | Refills: 0 | Status: CANCELLED | OUTPATIENT
Start: 2019-02-18

## 2019-02-18 NOTE — TELEPHONE ENCOUNTER
Fax received at [facility] requesting refill for atorvastatin calcium tablet 10 mg, take 1 tablet by mouth nightly. Note states Dr Donald Baxter last filled on 11/09/18 did pt D/C med? If not please authorize.

## 2019-02-19 ENCOUNTER — TELEPHONE (OUTPATIENT)
Dept: INTERNAL MEDICINE CLINIC | Facility: CLINIC | Age: 49
End: 2019-02-19

## 2019-02-19 DIAGNOSIS — R80.9 TYPE 2 DIABETES MELLITUS WITH MICROALBUMINURIA, WITHOUT LONG-TERM CURRENT USE OF INSULIN (HCC): Primary | ICD-10-CM

## 2019-02-19 DIAGNOSIS — E11.29 TYPE 2 DIABETES MELLITUS WITH MICROALBUMINURIA, WITHOUT LONG-TERM CURRENT USE OF INSULIN (HCC): Primary | ICD-10-CM

## 2019-02-19 RX ORDER — ROSUVASTATIN CALCIUM 10 MG/1
10 TABLET, COATED ORAL NIGHTLY
Qty: 90 TABLET | Refills: 0 | Status: SHIPPED | OUTPATIENT
Start: 2019-02-19 | End: 2019-07-05

## 2019-02-19 NOTE — TELEPHONE ENCOUNTER
Patient calling stating she does not take atorvastatin. Per patient, the atorvastatin was causing her to have dizziness and other side effects that she cannot remember at this moment.      Patient states she has been working on lowering her cholesterol by

## 2019-02-20 NOTE — TELEPHONE ENCOUNTER
Spoke to patient she did not feel well on atorvastatin, advised her to start Crestor 10 mg daily and check labs in 6 8 weeks after starting new medication, orders placed, advised patient to see endocrinologist once more

## 2019-02-20 NOTE — TELEPHONE ENCOUNTER
Contacted patient, she has not been taking atorvastatin because it made her have diarrhea, advised patient we can try Crestor 10 mg daily and she should have blood test in about 6 weeks after starting the new prescription, prescription sent to the pharmacy

## 2019-03-21 ENCOUNTER — NURSE TRIAGE (OUTPATIENT)
Dept: OTHER | Age: 49
End: 2019-03-21

## 2019-03-21 RX ORDER — CYCLOBENZAPRINE HCL 10 MG
10 TABLET ORAL NIGHTLY
Qty: 30 TABLET | Refills: 0 | Status: CANCELLED | OUTPATIENT
Start: 2019-03-21

## 2019-03-21 NOTE — TELEPHONE ENCOUNTER
Action Requested: Summary for Provider     []  Critical Lab, Recommendations Needed  [x] Need Additional Advice  []   FYI    []   Need Orders  [x] Need Medications Sent to Pharmacy  []  Other     SUMMARY: Patient calling with complaint of fever, body aches

## 2019-03-22 ENCOUNTER — TELEPHONE (OUTPATIENT)
Dept: INTERNAL MEDICINE CLINIC | Facility: CLINIC | Age: 49
End: 2019-03-22

## 2019-03-22 DIAGNOSIS — E78.00 PURE HYPERCHOLESTEROLEMIA: ICD-10-CM

## 2019-03-22 DIAGNOSIS — Z79.4 TYPE 2 DIABETES MELLITUS WITH MICROALBUMINURIA, WITH LONG-TERM CURRENT USE OF INSULIN (HCC): Primary | ICD-10-CM

## 2019-03-22 DIAGNOSIS — R80.9 TYPE 2 DIABETES MELLITUS WITH MICROALBUMINURIA, WITH LONG-TERM CURRENT USE OF INSULIN (HCC): Primary | ICD-10-CM

## 2019-03-22 DIAGNOSIS — E11.29 TYPE 2 DIABETES MELLITUS WITH MICROALBUMINURIA, WITH LONG-TERM CURRENT USE OF INSULIN (HCC): Primary | ICD-10-CM

## 2019-03-22 RX ORDER — CYCLOBENZAPRINE HCL 10 MG
10 TABLET ORAL NIGHTLY
Qty: 60 TABLET | Refills: 0 | Status: SHIPPED | OUTPATIENT
Start: 2019-03-22 | End: 2019-07-08

## 2019-03-22 NOTE — TELEPHONE ENCOUNTER
Spoke  To pt, sounds like patient has a viral syndrome, she will take symptomatic treatment, will monitor blood sugar, advised to have blood work done prior to the visit on Tuesday

## 2019-03-25 ENCOUNTER — TELEPHONE (OUTPATIENT)
Dept: INTERNAL MEDICINE CLINIC | Facility: CLINIC | Age: 49
End: 2019-03-25

## 2019-03-25 ENCOUNTER — TELEPHONE (OUTPATIENT)
Dept: OTHER | Age: 49
End: 2019-03-25

## 2019-03-25 RX ORDER — BLOOD-GLUCOSE METER
EACH MISCELLANEOUS
Qty: 1 KIT | Refills: 0 | Status: SHIPPED | OUTPATIENT
Start: 2019-03-25 | End: 2019-04-13 | Stop reason: CLARIF

## 2019-03-25 RX ORDER — BLOOD-GLUCOSE CONTROL, NORMAL
EACH MISCELLANEOUS
Qty: 1 VIAL | Refills: 5 | Status: SHIPPED | OUTPATIENT
Start: 2019-03-25

## 2019-03-25 NOTE — TELEPHONE ENCOUNTER
Called pt. Left message to call back. Need to clarify with pt. If we are sending meds list to physical therapy or mental health? Called the [phone] the number written below is a mental health to get a fax #. Can transfer to [address] [place] [address].

## 2019-03-25 NOTE — TELEPHONE ENCOUNTER
Spoke with patient ( verified) and asking NK why her physical therapist at [facility] needs to know what medications she is on?     Patient asking NK if it is ok to send list of her medications to [facility] (ph #[phone]) before her appt

## 2019-03-25 NOTE — TELEPHONE ENCOUNTER
CONTOUR NEXT TEST In Vitro Strip TEST BLOOD GLUCOSE THREE TIMES DAILY Disp: 200 each Rfl: 3     Note:   This medication is no longer in formulary. Please send new Rx with pref. One touch ultra or verio.  Thank you

## 2019-03-25 NOTE — TELEPHONE ENCOUNTER
Rx approved  per diabetic supply protocol.         Diabetes Medications  Protocol Criteria:  · Appointment scheduled in the past 6 months or the next 3 months  · A1C < 7.5 in the past 6 months  · Creatinine in the past 12 months  · Creatinine result < 1.5

## 2019-03-26 ENCOUNTER — OFFICE VISIT (OUTPATIENT)
Dept: INTERNAL MEDICINE CLINIC | Facility: CLINIC | Age: 49
End: 2019-03-26
Payer: MEDICAID

## 2019-03-26 ENCOUNTER — LAB ENCOUNTER (OUTPATIENT)
Dept: LAB | Age: 49
End: 2019-03-26
Attending: INTERNAL MEDICINE
Payer: MEDICAID

## 2019-03-26 DIAGNOSIS — R80.9 TYPE 2 DIABETES MELLITUS WITH MICROALBUMINURIA, WITHOUT LONG-TERM CURRENT USE OF INSULIN (HCC): ICD-10-CM

## 2019-03-26 DIAGNOSIS — I10 ESSENTIAL HYPERTENSION: ICD-10-CM

## 2019-03-26 DIAGNOSIS — E11.29 TYPE 2 DIABETES MELLITUS WITH MICROALBUMINURIA, WITHOUT LONG-TERM CURRENT USE OF INSULIN (HCC): ICD-10-CM

## 2019-03-26 DIAGNOSIS — E78.00 PURE HYPERCHOLESTEROLEMIA: ICD-10-CM

## 2019-03-26 DIAGNOSIS — E11.29 TYPE 2 DIABETES MELLITUS WITH MICROALBUMINURIA, WITH LONG-TERM CURRENT USE OF INSULIN (HCC): ICD-10-CM

## 2019-03-26 DIAGNOSIS — J06.9 UPPER RESPIRATORY TRACT INFECTION, UNSPECIFIED TYPE: Primary | ICD-10-CM

## 2019-03-26 DIAGNOSIS — R80.9 TYPE 2 DIABETES MELLITUS WITH MICROALBUMINURIA, WITH LONG-TERM CURRENT USE OF INSULIN (HCC): ICD-10-CM

## 2019-03-26 DIAGNOSIS — Z79.4 TYPE 2 DIABETES MELLITUS WITH MICROALBUMINURIA, WITH LONG-TERM CURRENT USE OF INSULIN (HCC): ICD-10-CM

## 2019-03-26 LAB
ALBUMIN SERPL-MCNC: 3.5 G/DL (ref 3.4–5)
ALBUMIN/GLOB SERPL: 0.9 {RATIO} (ref 1–2)
ALP LIVER SERPL-CCNC: 56 U/L (ref 39–100)
ALT SERPL-CCNC: 44 U/L (ref 13–56)
ANION GAP SERPL CALC-SCNC: 7 MMOL/L (ref 0–18)
AST SERPL-CCNC: 24 U/L (ref 15–37)
BASOPHILS # BLD AUTO: 0.03 X10(3) UL (ref 0–0.2)
BASOPHILS NFR BLD AUTO: 0.7 %
BILIRUB SERPL-MCNC: 0.4 MG/DL (ref 0.1–2)
BUN BLD-MCNC: 9 MG/DL (ref 7–18)
BUN/CREAT SERPL: 13.6 (ref 10–20)
CALCIUM BLD-MCNC: 8.6 MG/DL (ref 8.5–10.1)
CHLORIDE SERPL-SCNC: 104 MMOL/L (ref 98–107)
CHOLEST SMN-MCNC: 168 MG/DL (ref ?–200)
CK SERPL-CCNC: 70 U/L (ref 26–192)
CO2 SERPL-SCNC: 28 MMOL/L (ref 21–32)
CREAT BLD-MCNC: 0.66 MG/DL (ref 0.55–1.02)
DEPRECATED RDW RBC AUTO: 43.6 FL (ref 35.1–46.3)
EOSINOPHIL # BLD AUTO: 0.11 X10(3) UL (ref 0–0.7)
EOSINOPHIL NFR BLD AUTO: 2.4 %
ERYTHROCYTE [DISTWIDTH] IN BLOOD BY AUTOMATED COUNT: 12.3 % (ref 11–15)
EST. AVERAGE GLUCOSE BLD GHB EST-MCNC: 223 MG/DL (ref 68–126)
GLOBULIN PLAS-MCNC: 3.9 G/DL (ref 2.8–4.4)
GLUCOSE BLD-MCNC: 105 MG/DL (ref 70–99)
HBA1C MFR BLD HPLC: 9.4 % (ref ?–5.7)
HCT VFR BLD AUTO: 43.6 % (ref 35–48)
HDLC SERPL-MCNC: 48 MG/DL (ref 40–59)
HGB BLD-MCNC: 15.1 G/DL (ref 12–16)
IMM GRANULOCYTES # BLD AUTO: 0.01 X10(3) UL (ref 0–1)
IMM GRANULOCYTES NFR BLD: 0.2 %
LDLC SERPL CALC-MCNC: 80 MG/DL (ref ?–100)
LYMPHOCYTES # BLD AUTO: 2.21 X10(3) UL (ref 1–4)
LYMPHOCYTES NFR BLD AUTO: 48.7 %
M PROTEIN MFR SERPL ELPH: 7.4 G/DL (ref 6.4–8.2)
MCH RBC QN AUTO: 33.3 PG (ref 26–34)
MCHC RBC AUTO-ENTMCNC: 34.6 G/DL (ref 31–37)
MCV RBC AUTO: 96.2 FL (ref 80–100)
MONOCYTES # BLD AUTO: 0.34 X10(3) UL (ref 0.1–1)
MONOCYTES NFR BLD AUTO: 7.5 %
NEUTROPHILS # BLD AUTO: 1.84 X10 (3) UL (ref 1.5–7.7)
NEUTROPHILS # BLD AUTO: 1.84 X10(3) UL (ref 1.5–7.7)
NEUTROPHILS NFR BLD AUTO: 40.5 %
NONHDLC SERPL-MCNC: 120 MG/DL (ref ?–130)
OSMOLALITY SERPL CALC.SUM OF ELEC: 287 MOSM/KG (ref 275–295)
PLATELET # BLD AUTO: 217 10(3)UL (ref 150–450)
POTASSIUM SERPL-SCNC: 3.8 MMOL/L (ref 3.5–5.1)
RBC # BLD AUTO: 4.53 X10(6)UL (ref 3.8–5.3)
SODIUM SERPL-SCNC: 139 MMOL/L (ref 136–145)
TRIGL SERPL-MCNC: 198 MG/DL (ref 30–149)
VLDLC SERPL CALC-MCNC: 40 MG/DL (ref 0–30)
WBC # BLD AUTO: 4.5 X10(3) UL (ref 4–11)

## 2019-03-26 PROCEDURE — 83036 HEMOGLOBIN GLYCOSYLATED A1C: CPT

## 2019-03-26 PROCEDURE — 80053 COMPREHEN METABOLIC PANEL: CPT

## 2019-03-26 PROCEDURE — 99212 OFFICE O/P EST SF 10 MIN: CPT | Performed by: INTERNAL MEDICINE

## 2019-03-26 PROCEDURE — 99214 OFFICE O/P EST MOD 30 MIN: CPT | Performed by: INTERNAL MEDICINE

## 2019-03-26 PROCEDURE — 80061 LIPID PANEL: CPT

## 2019-03-26 PROCEDURE — 85025 COMPLETE CBC W/AUTO DIFF WBC: CPT

## 2019-03-26 PROCEDURE — 82550 ASSAY OF CK (CPK): CPT

## 2019-03-26 PROCEDURE — 36415 COLL VENOUS BLD VENIPUNCTURE: CPT

## 2019-03-26 RX ORDER — INSULIN GLARGINE 100 [IU]/ML
INJECTION, SOLUTION SUBCUTANEOUS
Qty: 6 ML | Refills: 3 | Status: SHIPPED | OUTPATIENT
Start: 2019-03-26 | End: 2019-07-18

## 2019-03-26 RX ORDER — CODEINE PHOSPHATE AND GUAIFENESIN 10; 100 MG/5ML; MG/5ML
5 SOLUTION ORAL EVERY 6 HOURS PRN
Qty: 180 ML | Refills: 0 | Status: SHIPPED | OUTPATIENT
Start: 2019-03-26 | End: 2019-04-30

## 2019-03-29 VITALS
DIASTOLIC BLOOD PRESSURE: 74 MMHG | RESPIRATION RATE: 24 BRPM | HEIGHT: 65 IN | HEART RATE: 91 BPM | SYSTOLIC BLOOD PRESSURE: 115 MMHG | OXYGEN SATURATION: 95 % | TEMPERATURE: 98 F | BODY MASS INDEX: 35 KG/M2

## 2019-03-30 ENCOUNTER — TELEPHONE (OUTPATIENT)
Dept: OTHER | Age: 49
End: 2019-03-30

## 2019-03-30 RX ORDER — MONTELUKAST SODIUM 10 MG/1
TABLET ORAL
Qty: 90 TABLET | Refills: 0 | Status: SHIPPED | OUTPATIENT
Start: 2019-03-30

## 2019-03-30 RX ORDER — AMLODIPINE BESYLATE 10 MG/1
10 TABLET ORAL
Qty: 90 TABLET | Refills: 0 | Status: SHIPPED | OUTPATIENT
Start: 2019-03-30 | End: 2019-07-06

## 2019-03-30 RX ORDER — FLUTICASONE PROPIONATE 50 MCG
SPRAY, SUSPENSION (ML) NASAL
Qty: 16 G | Refills: 0 | Status: SHIPPED | OUTPATIENT
Start: 2019-03-30 | End: 2019-06-02

## 2019-03-30 NOTE — TELEPHONE ENCOUNTER
Pt requesting Amlodipine, Montelukast, and Fluticasone nasal spray refills. Pt also states when she belches she tastes a rotten egg smell. Pt not sure if related to any of the medications she is taking.     Please advise    Refill passed per [facility] next 3 months  Recent Outpatient Visits            4 days ago     Carmencita Rubinstein, Nikita Morejon MD    Office Visit    3 months ago Type 2 diabetes mellitus without complication, with long-term current use of insulin ([facility]    Rena ALONSO

## 2019-03-31 NOTE — PROGRESS NOTES
HPI:    Patient ID: Gaby Gaitan is a 50year old female.   Presents for follow-up of multiple medical conditions    HPI  Patient reports that last several days she has been having postnasal drainage, sore throat, mild cough, generaliz Subcutaneous Solution Pen-injector INJECT 1.2MG SUBCUTANEOUSLY DAILY Disp: 3 mL Rfl: 1   ERGOCALCIFEROL 57098 units Oral Cap Take 1 capsule by mouth once every week Disp: 4 capsule Rfl: 2   Pantoprazole Sodium 40 MG Oral Tab EC Take 1 tablet (40 mg total) BLOOD GLUCOSE THREE TIMES DAILY Disp: 200 each Rfl: 3   Insulin Pen Needle (PEN NEEDLES) 31G X 5 MM Does not apply Misc 1 each by Does not apply route as needed.  Use pen needle as directed Disp: 100 each Rfl: 6   Insulin Syringe 30G X 1/2\" 1 ML Does not a unspecified type  (primary encounter diagnosis)  Plan:  We will not treat with antibiotic,, patient will use Cheratussin AC cough suppressant as needed, report in 1 week if not improved    (E11.29,  R80.9) Type 2 diabetes mellitus with microalbuminuria, wit

## 2019-04-13 ENCOUNTER — TELEPHONE (OUTPATIENT)
Dept: INTERNAL MEDICINE CLINIC | Facility: CLINIC | Age: 49
End: 2019-04-13

## 2019-04-13 RX ORDER — BLOOD-GLUCOSE METER
EACH MISCELLANEOUS
Qty: 1 KIT | Refills: 0 | Status: SHIPPED | OUTPATIENT
Start: 2019-04-13

## 2019-04-13 NOTE — TELEPHONE ENCOUNTER
Order for [employer] Device re-sent to pharmacy per request, patient lost device recently received when ordered 3/25/19.

## 2019-04-16 ENCOUNTER — OFFICE VISIT (OUTPATIENT)
Dept: ENDOCRINOLOGY CLINIC | Facility: CLINIC | Age: 49
End: 2019-04-16
Payer: MEDICAID

## 2019-04-16 VITALS
HEIGHT: 64 IN | DIASTOLIC BLOOD PRESSURE: 74 MMHG | WEIGHT: 210 LBS | BODY MASS INDEX: 35.85 KG/M2 | HEART RATE: 89 BPM | SYSTOLIC BLOOD PRESSURE: 123 MMHG

## 2019-04-16 DIAGNOSIS — Z79.4 TYPE 2 DIABETES MELLITUS WITHOUT COMPLICATION, WITH LONG-TERM CURRENT USE OF INSULIN (HCC): Primary | ICD-10-CM

## 2019-04-16 DIAGNOSIS — E11.9 TYPE 2 DIABETES MELLITUS WITHOUT COMPLICATION, WITH LONG-TERM CURRENT USE OF INSULIN (HCC): Primary | ICD-10-CM

## 2019-04-16 PROCEDURE — 36416 COLLJ CAPILLARY BLOOD SPEC: CPT | Performed by: INTERNAL MEDICINE

## 2019-04-16 PROCEDURE — 99204 OFFICE O/P NEW MOD 45 MIN: CPT | Performed by: INTERNAL MEDICINE

## 2019-04-16 PROCEDURE — 82962 GLUCOSE BLOOD TEST: CPT | Performed by: INTERNAL MEDICINE

## 2019-04-16 RX ORDER — BLOOD SUGAR DIAGNOSTIC
STRIP MISCELLANEOUS
Qty: 200 STRIP | Refills: 0 | Status: SHIPPED | OUTPATIENT
Start: 2019-04-16 | End: 2019-07-08

## 2019-04-16 NOTE — H&P
New Patient Visit - Diabetes    CONSULT - Reason for Visit:  Diabetes management.     Requesting Physician: Dr. Christine Hancock:  Patient presents with:  Consult: DM consult, Type 2 diagnosed in 2008        HISTORY OF PRESENT ILLNESS:   [place] UNIT/ML Subcutaneous Solution Pen-injector INJECT 20 UNITS BY SUBCUTANEOUS ROUTE EVERY EVENING Disp: 6 mL Rfl: 3   guaiFENesin-codeine (CHERATUSSIN AC) 100-10 MG/5ML Oral Solution Take 5 mL by mouth every 6 (six) hours as needed for cough.  rx  Phoned in Claudia Olsen loratadine (CLARITIN) 10 MG Oral Tab Take 1 tablet (10 mg total) by mouth daily. Disp: 30 tablet Rfl: 6   Amphetamine-Dextroamphet ER 20 MG Oral Capsule SR 24 Hr Take 20 mg by mouth every morning.  Disp:  Rfl:    alprazolam (XANAX) 1 MG Oral Tab 3 (three) Relation Age of Onset   • Breast Cancer Other         Cause of death at age 79   • Hypertension Mother    • Heart Disease Mother    • Genito-Urinary Disorder Mother 54        Ovarian cyst-Hyst; hyst and 3 ovaries removed   • Breast Cancer Maternal Grandmot reviewed  a1c is 9.4 %    ASSESSMENT AND PLAN:    1. Type 2  DM: uncontrolled     Plan:  Discussed the pathogenesis, natural course of diabetes.  Patient understands the importance of glycemic control and the implications of uncontrolled diabetes including

## 2019-04-27 ENCOUNTER — NURSE TRIAGE (OUTPATIENT)
Dept: OTHER | Age: 49
End: 2019-04-27

## 2019-04-27 RX ORDER — CODEINE PHOSPHATE AND GUAIFENESIN 10; 100 MG/5ML; MG/5ML
5 SOLUTION ORAL EVERY 6 HOURS PRN
Qty: 180 ML | Refills: 0 | OUTPATIENT
Start: 2019-04-27 | End: 2019-06-27

## 2019-04-27 NOTE — TELEPHONE ENCOUNTER
Please phone in [facility]  SEE MEDS LIST AND  ACCOMODATE  PT ON Tuesday DOUBLE BOOK LAST APPT  IF NEEDED

## 2019-04-27 NOTE — TELEPHONE ENCOUNTER
Verified pt name and . Reviewed doctor's recommendations as noted before. Appt scheduled 19 per doctor's instructions. Pt states she has an area on her chin, thinks it is a fever blister.  Pt states there was some bloody drainage at first, is dri

## 2019-04-27 NOTE — TELEPHONE ENCOUNTER
Action Requested: Summary for Provider     []  Critical Lab, Recommendations Needed  [] Need Additional Advice  []   FYI    []   Need Orders  [] Need Medications Sent to Pharmacy  []  Other     SUMMARY:Pt requesting  Cough syrup, c/c productive cough-yello

## 2019-04-30 ENCOUNTER — OFFICE VISIT (OUTPATIENT)
Dept: INTERNAL MEDICINE CLINIC | Facility: CLINIC | Age: 49
End: 2019-04-30
Payer: MEDICAID

## 2019-04-30 VITALS
TEMPERATURE: 98 F | WEIGHT: 208 LBS | BODY MASS INDEX: 36 KG/M2 | RESPIRATION RATE: 22 BRPM | SYSTOLIC BLOOD PRESSURE: 145 MMHG | DIASTOLIC BLOOD PRESSURE: 71 MMHG | HEART RATE: 88 BPM | OXYGEN SATURATION: 97 %

## 2019-04-30 DIAGNOSIS — E11.9 TYPE 2 DIABETES MELLITUS WITHOUT COMPLICATION, WITH LONG-TERM CURRENT USE OF INSULIN (HCC): ICD-10-CM

## 2019-04-30 DIAGNOSIS — J01.80 ACUTE NON-RECURRENT SINUSITIS OF OTHER SINUS: Primary | ICD-10-CM

## 2019-04-30 DIAGNOSIS — E78.00 PURE HYPERCHOLESTEROLEMIA: ICD-10-CM

## 2019-04-30 DIAGNOSIS — Z79.4 TYPE 2 DIABETES MELLITUS WITHOUT COMPLICATION, WITH LONG-TERM CURRENT USE OF INSULIN (HCC): ICD-10-CM

## 2019-04-30 PROCEDURE — 99214 OFFICE O/P EST MOD 30 MIN: CPT | Performed by: INTERNAL MEDICINE

## 2019-04-30 PROCEDURE — 99212 OFFICE O/P EST SF 10 MIN: CPT | Performed by: INTERNAL MEDICINE

## 2019-04-30 RX ORDER — DOXYCYCLINE HYCLATE 100 MG
100 TABLET ORAL 2 TIMES DAILY
Qty: 14 TABLET | Refills: 0 | Status: SHIPPED | OUTPATIENT
Start: 2019-04-30 | End: 2019-06-18

## 2019-05-01 NOTE — PROGRESS NOTES
HPI:    Patient ID: Cinthia Cisneros is a 50year old female. Presents for evaluation of the cold symptoms.     HPI  Patient reports that about 5 days ago she started to experience generalized fatigue fatigue chills, a lot of pressure ove Disp: 16 g Rfl: 0   Montelukast Sodium 10 MG Oral Tab TAKE ONE TABLET BY MOUTH NIGHTLY Disp: 90 tablet Rfl: 0   BASAGLAR KWIKPEN 100 UNIT/ML Subcutaneous Solution Pen-injector INJECT 20 UNITS BY SUBCUTANEOUS ROUTE EVERY EVENING Disp: 6 mL Rfl: 3   Ashben three times daily Disp: 1 vial Rfl: 5   Lancets Misc. Does not apply Misc Use to check blood glucose three times daily.  Disp: 300 each Rfl: 1   Pantoprazole Sodium 40 MG Oral Tab EC Take 1 tablet (40 mg total) by mouth every morning before breakfast. (Lesa

## 2019-05-04 RX ORDER — ERGOCALCIFEROL 1.25 MG/1
CAPSULE ORAL
Qty: 4 CAPSULE | Refills: 1 | Status: SHIPPED | OUTPATIENT
Start: 2019-05-04 | End: 2019-09-03

## 2019-05-04 RX ORDER — GLIPIZIDE 5 MG/1
TABLET, FILM COATED, EXTENDED RELEASE ORAL
Qty: 90 TABLET | Refills: 3 | Status: SHIPPED | OUTPATIENT
Start: 2019-05-04

## 2019-05-21 NOTE — TELEPHONE ENCOUNTER
Please review; protocol failed.      Diabetes Medications  Protocol Criteria:  · Appointment scheduled in the past 6 months or the next 3 months  · A1C < 7.5 in the past 6 months  · Creatinine in the past 12 months  · Creatinine result < 1.5   Recent Outpat

## 2019-06-03 RX ORDER — FLUTICASONE PROPIONATE 50 MCG
SPRAY, SUSPENSION (ML) NASAL
Qty: 16 G | Refills: 0 | Status: SHIPPED | OUTPATIENT
Start: 2019-06-03

## 2019-06-12 ENCOUNTER — NURSE TRIAGE (OUTPATIENT)
Dept: OTHER | Age: 49
End: 2019-06-12

## 2019-06-12 NOTE — TELEPHONE ENCOUNTER
Name and  verified. Pt informed. Verbalized good understanding of all with intent to comply. appt made, pt agreed to date, time and location.

## 2019-06-12 NOTE — TELEPHONE ENCOUNTER
Action Requested: Summary for Provider     []  Critical Lab, Recommendations Needed  [] Need Additional Advice  []   FYI    []   Need Orders  [] Need Medications Sent to Pharmacy  []  Other     SUMMARY: Patient refusing OV today, only wants to see Dr. Madiha Beckford

## 2019-06-18 ENCOUNTER — OFFICE VISIT (OUTPATIENT)
Dept: INTERNAL MEDICINE CLINIC | Facility: CLINIC | Age: 49
End: 2019-06-18
Payer: MEDICAID

## 2019-06-18 VITALS
RESPIRATION RATE: 22 BRPM | TEMPERATURE: 98 F | DIASTOLIC BLOOD PRESSURE: 75 MMHG | SYSTOLIC BLOOD PRESSURE: 144 MMHG | HEART RATE: 94 BPM | BODY MASS INDEX: 35 KG/M2 | WEIGHT: 204 LBS

## 2019-06-18 DIAGNOSIS — J02.8 PHARYNGITIS DUE TO OTHER ORGANISM: ICD-10-CM

## 2019-06-18 DIAGNOSIS — E55.9 VITAMIN D DEFICIENCY: ICD-10-CM

## 2019-06-18 DIAGNOSIS — M79.10 MYALGIA: ICD-10-CM

## 2019-06-18 DIAGNOSIS — Z79.4 TYPE 2 DIABETES MELLITUS WITH DIABETIC POLYNEUROPATHY, WITH LONG-TERM CURRENT USE OF INSULIN (HCC): Primary | ICD-10-CM

## 2019-06-18 DIAGNOSIS — M25.551 PAIN OF RIGHT HIP JOINT: ICD-10-CM

## 2019-06-18 DIAGNOSIS — M25.559 ARTHRALGIA OF HIP, UNSPECIFIED LATERALITY: ICD-10-CM

## 2019-06-18 DIAGNOSIS — M54.16 LUMBAR RADICULOPATHY: ICD-10-CM

## 2019-06-18 DIAGNOSIS — E11.42 TYPE 2 DIABETES MELLITUS WITH DIABETIC POLYNEUROPATHY, WITH LONG-TERM CURRENT USE OF INSULIN (HCC): Primary | ICD-10-CM

## 2019-06-18 DIAGNOSIS — R68.2 DRY MOUTH: ICD-10-CM

## 2019-06-18 PROCEDURE — 99214 OFFICE O/P EST MOD 30 MIN: CPT | Performed by: INTERNAL MEDICINE

## 2019-06-18 PROCEDURE — 99212 OFFICE O/P EST SF 10 MIN: CPT | Performed by: INTERNAL MEDICINE

## 2019-06-19 NOTE — PROGRESS NOTES
HPI:    Patient ID: Pedro Hernandez is a 52year old female.   Presents for evaluation of multiple concerns    HPI   Patient reports that last 2 weeks she has been experiencing sore throat, soreness of the glands in the neck, and a fronta diarrhea and constipation. Musculoskeletal: Positive for myalgias, back pain, joint pain and neck pain. Neurological: Positive for headaches. Negative for tremors, weakness and light-headedness.             Current Outpatient Medications:  FLUTICASONE P Inhalation Aerosol Powder, Breath Activated Inhale 2 puffs into the lungs 2 (two) times daily. Disp: 1 each Rfl: 6   loratadine (CLARITIN) 10 MG Oral Tab Take 1 tablet (10 mg total) by mouth daily.  Disp: 30 tablet Rfl: 6   Amphetamine-Dextroamphet ER 20 MG Brachioradialis reflexes are 2+ on the right side and 2+ on the left side. Patellar reflexes are 2+ on the right side and 2+ on the left side. Achilles reflexes are 2+ on the right side and 2+ on the left side. Skin: Skin is dry and intact.  No

## 2019-06-24 ENCOUNTER — TELEPHONE (OUTPATIENT)
Dept: OTHER | Age: 49
End: 2019-06-24

## 2019-06-24 NOTE — TELEPHONE ENCOUNTER
Pt states she was advised to call the office if she wasn't feeling any better, pt was seen in office 6/18/19. Pt states she is feeling worse, only wants to talk to Dr. Krupa Viera.

## 2019-06-25 ENCOUNTER — HOSPITAL ENCOUNTER (OUTPATIENT)
Dept: GENERAL RADIOLOGY | Facility: HOSPITAL | Age: 49
Discharge: HOME | End: 2019-06-25
Attending: INTERNAL MEDICINE
Payer: MEDICAID

## 2019-06-25 DIAGNOSIS — E11.42 TYPE 2 DIABETES MELLITUS WITH DIABETIC POLYNEUROPATHY, WITH LONG-TERM CURRENT USE OF INSULIN (HCC): ICD-10-CM

## 2019-06-25 DIAGNOSIS — Z79.4 TYPE 2 DIABETES MELLITUS WITH DIABETIC POLYNEUROPATHY, WITH LONG-TERM CURRENT USE OF INSULIN (HCC): ICD-10-CM

## 2019-06-25 DIAGNOSIS — R68.2 DRY MOUTH: ICD-10-CM

## 2019-06-25 DIAGNOSIS — M25.551 PAIN OF RIGHT HIP JOINT: ICD-10-CM

## 2019-06-25 DIAGNOSIS — M25.559 ARTHRALGIA OF HIP, UNSPECIFIED LATERALITY: ICD-10-CM

## 2019-06-25 PROCEDURE — 72110 X-RAY EXAM L-2 SPINE 4/>VWS: CPT | Performed by: INTERNAL MEDICINE

## 2019-06-25 PROCEDURE — 73502 X-RAY EXAM HIP UNI 2-3 VIEWS: CPT | Performed by: INTERNAL MEDICINE

## 2019-06-25 NOTE — TELEPHONE ENCOUNTER
Spoke to patient she states that she has a lot of pressure in the sinus area, nothing draining, neck hurts, she felt fine Saturday today went to work and now staying in bed, feels chilled, but no real temperature, no cough, states that she brings up and do

## 2019-06-27 ENCOUNTER — TELEPHONE (OUTPATIENT)
Dept: INTERNAL MEDICINE CLINIC | Facility: CLINIC | Age: 49
End: 2019-06-27

## 2019-06-27 RX ORDER — GUAIFENESIN AND CODEINE PHOSPHATE 10; 100 MG/5ML; MG/5ML
LIQUID ORAL
Qty: 180 ML | Refills: 0 | Status: SHIPPED | OUTPATIENT
Start: 2019-06-27 | End: 2019-08-08

## 2019-06-27 RX ORDER — CEFDINIR 300 MG/1
300 CAPSULE ORAL 2 TIMES DAILY
Qty: 14 CAPSULE | Refills: 1 | Status: SHIPPED | OUTPATIENT
Start: 2019-06-27 | End: 2019-07-08 | Stop reason: ALTCHOICE

## 2019-06-27 NOTE — TELEPHONE ENCOUNTER
Spoke with patient RE: pharmacy request for Guaiatussin--very hoarse voice--hard to hear patient speak. \"I just saw her last week and my voice is getting worse--I can hardly talk. I need to get better to go to work tomorrow.  Can she also send me an ant

## 2019-06-27 NOTE — TELEPHONE ENCOUNTER
Pt states she missed Dr Buck Duncan call. Pt is following message below. Dr Isaac Wong, pt is waiting for your call.  Thanks

## 2019-06-27 NOTE — TELEPHONE ENCOUNTER
Spoke with the patient who reports her throat continues to hurt and she has lost her voice. Patient reports she has a dry cough, and has body aches, and she is not feeling better. Patient denies having a fever, but reports her head feels warm.  This nurse c

## 2019-06-29 ENCOUNTER — TELEPHONE (OUTPATIENT)
Dept: OTHER | Age: 49
End: 2019-06-29

## 2019-06-29 NOTE — TELEPHONE ENCOUNTER
Reviewed with patient recent x-ray results, moderate osteoarthritis of the spine, mild arthritis of the right hip, patient has appointments with rheumatologist and physiatry wrist in July, she states that voice is back, she is taking antibiotics started ye

## 2019-06-29 NOTE — TELEPHONE ENCOUNTER
Pt states Dr Gualberto Lucas just called her.   Pt returning her call, per pt \"I think it is regarding my test results\"

## 2019-07-02 ENCOUNTER — TELEPHONE (OUTPATIENT)
Dept: INTERNAL MEDICINE CLINIC | Facility: CLINIC | Age: 49
End: 2019-07-02

## 2019-07-02 NOTE — TELEPHONE ENCOUNTER
Office staff, please assist with note for work. Patient requesting that it be posted to [address]  Thanks    Please respond to pool: AMBAR RICK LPN/MARYLOU

## 2019-07-02 NOTE — TELEPHONE ENCOUNTER
Pt. Called back requested to be off from July 3 to 8th may go back to work July 9. Pt. Will print Excuse to work note from [employer]. Pt. Verbalized understanding.

## 2019-07-02 NOTE — TELEPHONE ENCOUNTER
LMTCB. Need exact dates off work and when to return to work.   Transfer to [facility]

## 2019-07-02 NOTE — TELEPHONE ENCOUNTER
Patient requesting work note, reports has been sick for several weeks with ongoing sinus congestion, HA, sore throat, has now lost her voice, is currently on antibiotic, has taken several days off work already but not better.  Requesting work note for this

## 2019-07-06 RX ORDER — ROSUVASTATIN CALCIUM 10 MG/1
10 TABLET, COATED ORAL NIGHTLY
Qty: 30 TABLET | Refills: 1 | Status: SHIPPED | OUTPATIENT
Start: 2019-07-06 | End: 2019-09-01

## 2019-07-06 RX ORDER — AMLODIPINE BESYLATE 10 MG/1
TABLET ORAL
Qty: 90 TABLET | Refills: 1 | Status: SHIPPED | OUTPATIENT
Start: 2019-07-06 | End: 2019-12-21

## 2019-07-06 NOTE — TELEPHONE ENCOUNTER
Refill passed per [facility] protocol.   Cholesterol Medications  Protocol Criteria:  · Appointment scheduled in the past 12 months or in the next 3 months  · ALT & LDL on file in the past 12 months  · ALT result < 80  · LDL result <130   Recent Outpat

## 2019-07-06 NOTE — TELEPHONE ENCOUNTER
Review pended refill request as it does not fall under a protocol.   Requested Prescriptions     Pending Prescriptions Disp Refills   • AMLODIPINE BESYLATE 10 MG Oral Tab [Pharmacy Med Name: Amlodipine Besylate 10 Mg Tab Cipl] 30 tablet 0     Sig: TAKE ONE

## 2019-07-08 ENCOUNTER — OFFICE VISIT (OUTPATIENT)
Dept: RHEUMATOLOGY | Facility: CLINIC | Age: 49
End: 2019-07-08
Payer: MEDICAID

## 2019-07-08 ENCOUNTER — OFFICE VISIT (OUTPATIENT)
Dept: NEUROLOGY | Facility: CLINIC | Age: 49
End: 2019-07-08
Payer: MEDICAID

## 2019-07-08 ENCOUNTER — HOSPITAL ENCOUNTER (OUTPATIENT)
Dept: GENERAL RADIOLOGY | Facility: HOSPITAL | Age: 49
Discharge: HOME | End: 2019-07-08
Attending: PHYSICAL MEDICINE & REHABILITATION
Payer: MEDICAID

## 2019-07-08 ENCOUNTER — LAB ENCOUNTER (OUTPATIENT)
Dept: LAB | Facility: HOSPITAL | Age: 49
End: 2019-07-08
Attending: INTERNAL MEDICINE
Payer: MEDICAID

## 2019-07-08 VITALS
DIASTOLIC BLOOD PRESSURE: 76 MMHG | HEART RATE: 81 BPM | HEIGHT: 64 IN | BODY MASS INDEX: 34.66 KG/M2 | SYSTOLIC BLOOD PRESSURE: 112 MMHG | WEIGHT: 203 LBS

## 2019-07-08 VITALS
DIASTOLIC BLOOD PRESSURE: 79 MMHG | RESPIRATION RATE: 17 BRPM | HEIGHT: 64 IN | BODY MASS INDEX: 34.66 KG/M2 | HEART RATE: 84 BPM | WEIGHT: 203 LBS | SYSTOLIC BLOOD PRESSURE: 121 MMHG

## 2019-07-08 DIAGNOSIS — M22.2X1 PATELLOFEMORAL PAIN SYNDROME OF RIGHT KNEE: ICD-10-CM

## 2019-07-08 DIAGNOSIS — Z79.4 TYPE 2 DIABETES MELLITUS WITH DIABETIC POLYNEUROPATHY, WITH LONG-TERM CURRENT USE OF INSULIN (HCC): ICD-10-CM

## 2019-07-08 DIAGNOSIS — M79.10 MYALGIA: ICD-10-CM

## 2019-07-08 DIAGNOSIS — E11.9 TYPE 2 DIABETES MELLITUS WITHOUT COMPLICATION, WITH LONG-TERM CURRENT USE OF INSULIN (HCC): ICD-10-CM

## 2019-07-08 DIAGNOSIS — G89.29 CHRONIC PAIN OF RIGHT KNEE: ICD-10-CM

## 2019-07-08 DIAGNOSIS — E11.42 TYPE 2 DIABETES MELLITUS WITH DIABETIC POLYNEUROPATHY, WITH LONG-TERM CURRENT USE OF INSULIN (HCC): ICD-10-CM

## 2019-07-08 DIAGNOSIS — Z79.4 TYPE 2 DIABETES MELLITUS WITHOUT COMPLICATION, WITH LONG-TERM CURRENT USE OF INSULIN (HCC): ICD-10-CM

## 2019-07-08 DIAGNOSIS — M54.50 LOW BACK PAIN WITHOUT SCIATICA, UNSPECIFIED BACK PAIN LATERALITY, UNSPECIFIED CHRONICITY: ICD-10-CM

## 2019-07-08 DIAGNOSIS — R68.2 DRY MOUTH: ICD-10-CM

## 2019-07-08 DIAGNOSIS — M25.561 CHRONIC PAIN OF RIGHT KNEE: ICD-10-CM

## 2019-07-08 DIAGNOSIS — M25.559 ARTHRALGIA OF HIP, UNSPECIFIED LATERALITY: ICD-10-CM

## 2019-07-08 DIAGNOSIS — M25.50 POLYARTHRALGIA: Primary | ICD-10-CM

## 2019-07-08 DIAGNOSIS — E55.9 VITAMIN D DEFICIENCY: ICD-10-CM

## 2019-07-08 DIAGNOSIS — M70.51 PES ANSERINUS BURSITIS OF RIGHT KNEE: Primary | ICD-10-CM

## 2019-07-08 DIAGNOSIS — M70.51 PES ANSERINUS BURSITIS OF RIGHT KNEE: ICD-10-CM

## 2019-07-08 DIAGNOSIS — F31.31 BIPOLAR 1 DISORDER, DEPRESSED, MILD (HCC): ICD-10-CM

## 2019-07-08 DIAGNOSIS — M25.50 POLYARTHRALGIA: ICD-10-CM

## 2019-07-08 LAB
ALBUMIN SERPL-MCNC: 3.6 G/DL (ref 3.4–5)
ALBUMIN/GLOB SERPL: 1 {RATIO} (ref 1–2)
ALP LIVER SERPL-CCNC: 56 U/L (ref 39–100)
ALT SERPL-CCNC: 32 U/L (ref 13–56)
ANION GAP SERPL CALC-SCNC: 7 MMOL/L (ref 0–18)
AST SERPL-CCNC: 21 U/L (ref 15–37)
BASOPHILS # BLD AUTO: 0.05 X10(3) UL (ref 0–0.2)
BASOPHILS NFR BLD AUTO: 0.8 %
BILIRUB SERPL-MCNC: 0.6 MG/DL (ref 0.1–2)
BUN BLD-MCNC: 12 MG/DL (ref 7–18)
BUN/CREAT SERPL: 17.1 (ref 10–20)
CALCIUM BLD-MCNC: 8.8 MG/DL (ref 8.5–10.1)
CHLORIDE SERPL-SCNC: 107 MMOL/L (ref 98–112)
CHOLEST SMN-MCNC: 122 MG/DL (ref ?–200)
CK SERPL-CCNC: 59 U/L (ref 26–192)
CO2 SERPL-SCNC: 24 MMOL/L (ref 21–32)
CREAT BLD-MCNC: 0.7 MG/DL (ref 0.55–1.02)
CRP SERPL-MCNC: <0.29 MG/DL (ref ?–0.3)
DEPRECATED RDW RBC AUTO: 43.5 FL (ref 35.1–46.3)
EOSINOPHIL # BLD AUTO: 0.1 X10(3) UL (ref 0–0.7)
EOSINOPHIL NFR BLD AUTO: 1.6 %
ERYTHROCYTE [DISTWIDTH] IN BLOOD BY AUTOMATED COUNT: 11.9 % (ref 11–15)
ERYTHROCYTE [SEDIMENTATION RATE] IN BLOOD: 6 MM/HR (ref 0–20)
EST. AVERAGE GLUCOSE BLD GHB EST-MCNC: 217 MG/DL (ref 68–126)
GLOBULIN PLAS-MCNC: 3.7 G/DL (ref 2.8–4.4)
GLUCOSE BLD-MCNC: 123 MG/DL (ref 70–99)
HBA1C MFR BLD HPLC: 9.2 % (ref ?–5.7)
HCT VFR BLD AUTO: 47.5 % (ref 35–48)
HDLC SERPL-MCNC: 42 MG/DL (ref 40–59)
HGB BLD-MCNC: 16.5 G/DL (ref 12–16)
IMM GRANULOCYTES # BLD AUTO: 0.01 X10(3) UL (ref 0–1)
IMM GRANULOCYTES NFR BLD: 0.2 %
LDLC SERPL CALC-MCNC: 57 MG/DL (ref ?–100)
LYMPHOCYTES # BLD AUTO: 2.59 X10(3) UL (ref 1–4)
LYMPHOCYTES NFR BLD AUTO: 40.5 %
M PROTEIN MFR SERPL ELPH: 7.3 G/DL (ref 6.4–8.2)
MCH RBC QN AUTO: 33.8 PG (ref 26–34)
MCHC RBC AUTO-ENTMCNC: 34.7 G/DL (ref 31–37)
MCV RBC AUTO: 97.3 FL (ref 80–100)
MONOCYTES # BLD AUTO: 0.42 X10(3) UL (ref 0.1–1)
MONOCYTES NFR BLD AUTO: 6.6 %
NEUTROPHILS # BLD AUTO: 3.22 X10 (3) UL (ref 1.5–7.7)
NEUTROPHILS # BLD AUTO: 3.22 X10(3) UL (ref 1.5–7.7)
NEUTROPHILS NFR BLD AUTO: 50.3 %
NONHDLC SERPL-MCNC: 80 MG/DL (ref ?–130)
OSMOLALITY SERPL CALC.SUM OF ELEC: 287 MOSM/KG (ref 275–295)
PATIENT FASTING: YES
PATIENT FASTING: YES
PLATELET # BLD AUTO: 186 10(3)UL (ref 150–450)
POTASSIUM SERPL-SCNC: 4.5 MMOL/L (ref 3.5–5.1)
RBC # BLD AUTO: 4.88 X10(6)UL (ref 3.8–5.3)
RHEUMATOID FACT SERPL-ACNC: <10 IU/ML (ref ?–15)
SODIUM SERPL-SCNC: 138 MMOL/L (ref 136–145)
TRIGL SERPL-MCNC: 117 MG/DL (ref 30–149)
TSI SER-ACNC: 1.71 MIU/ML (ref 0.36–3.74)
VLDLC SERPL CALC-MCNC: 23 MG/DL (ref 0–30)
WBC # BLD AUTO: 6.4 X10(3) UL (ref 4–11)

## 2019-07-08 PROCEDURE — 82306 VITAMIN D 25 HYDROXY: CPT

## 2019-07-08 PROCEDURE — 36415 COLL VENOUS BLD VENIPUNCTURE: CPT

## 2019-07-08 PROCEDURE — 84443 ASSAY THYROID STIM HORMONE: CPT

## 2019-07-08 PROCEDURE — 86431 RHEUMATOID FACTOR QUANT: CPT

## 2019-07-08 PROCEDURE — 86235 NUCLEAR ANTIGEN ANTIBODY: CPT

## 2019-07-08 PROCEDURE — 80053 COMPREHEN METABOLIC PANEL: CPT

## 2019-07-08 PROCEDURE — 99244 OFF/OP CNSLTJ NEW/EST MOD 40: CPT | Performed by: PHYSICAL MEDICINE & REHABILITATION

## 2019-07-08 PROCEDURE — 86812 HLA TYPING A B OR C: CPT

## 2019-07-08 PROCEDURE — 85652 RBC SED RATE AUTOMATED: CPT

## 2019-07-08 PROCEDURE — 82550 ASSAY OF CK (CPK): CPT

## 2019-07-08 PROCEDURE — 80061 LIPID PANEL: CPT

## 2019-07-08 PROCEDURE — 83036 HEMOGLOBIN GLYCOSYLATED A1C: CPT

## 2019-07-08 PROCEDURE — 99214 OFFICE O/P EST MOD 30 MIN: CPT | Performed by: INTERNAL MEDICINE

## 2019-07-08 PROCEDURE — 86200 CCP ANTIBODY: CPT

## 2019-07-08 PROCEDURE — 85025 COMPLETE CBC W/AUTO DIFF WBC: CPT

## 2019-07-08 PROCEDURE — 73564 X-RAY EXAM KNEE 4 OR MORE: CPT | Performed by: PHYSICAL MEDICINE & REHABILITATION

## 2019-07-08 PROCEDURE — 86140 C-REACTIVE PROTEIN: CPT

## 2019-07-08 RX ORDER — MELOXICAM 15 MG/1
15 TABLET ORAL DAILY
Qty: 30 TABLET | Refills: 1 | Status: SHIPPED | OUTPATIENT
Start: 2019-07-08 | End: 2019-07-08

## 2019-07-08 RX ORDER — DICLOFENAC SODIUM 75 MG/1
75 TABLET, DELAYED RELEASE ORAL 2 TIMES DAILY
Qty: 60 TABLET | Refills: 1 | Status: SHIPPED | OUTPATIENT
Start: 2019-07-08 | End: 2020-01-14

## 2019-07-08 RX ORDER — CYCLOBENZAPRINE HCL 10 MG
TABLET ORAL
Qty: 60 TABLET | Refills: 1 | Status: SHIPPED | OUTPATIENT
Start: 2019-07-08

## 2019-07-08 NOTE — H&P
[address] H&P    Requesting Physician: Mely Kuhn MD    Chief Complaint (Reason for Visit):  Patient presents with:  Low Back Pain: New pt presents for LBP who has RA, saw Dr. Yonathan Spangler today.  U Problem Relation Age of Onset   • Breast Cancer Other         Cause of death at age 79   • Hypertension Mother    • Heart Disease Mother    • Genito-Urinary Disorder Mother 54        Ovarian cyst-Hyst; hyst and 3 ovaries removed   • Breast Cancer Materna 90 tablet Rfl: 0   Blood Glucose Monitoring Suppl (ONETOUCH ULTRA 2) w/Device Does not apply Kit Use to check blood sugar three times daily.  Disp: 1 kit Rfl: 0   Montelukast Sodium 10 MG Oral Tab TAKE ONE TABLET BY MOUTH NIGHTLY Disp: 90 tablet Rfl: 0   BA Tavist [Clemastine]       Tylenol Sinus Max             REVIEW OF SYSTEMS:   Review of Systems   Constitutional: Negative. HENT: Negative. Eyes: Negative. Respiratory: Negative. Cardiovascular: Negative. Gastrointestinal: Negative. negative for instability  Valgus/Varus stress test: negative for instablity  Martir Test: negative for medial or lateral joint line pain or \"catch\"        Gait:  normal    Data  [facility] Lab Encounter on 07/08/2019   Component Date Value Ref Range Status <0.29  <0.30 mg/dL Final   • TSH 07/08/2019 1.710  0.358 - 3.740 mIU/mL Final   • WBC 07/08/2019 6.4  4.0 - 11.0 x10(3) uL Final   • RBC 07/08/2019 4.88  3.80 - 5.30 x10(6)uL Final   • HGB 07/08/2019 16.5* 12.0 - 16.0 g/dL Final   • HCT 07/08/2019 47.5  35 18 mmol/L Final   • BUN 03/26/2019 9  7 - 18 mg/dL Final   • Creatinine 03/26/2019 0.66  0.55 - 1.02 mg/dL Final   • BUN/CREA Ratio 03/26/2019 13.6  10.0 - 20.0 Final   • Calcium, Total 03/26/2019 8.6  8.5 - 10.1 mg/dL Final   • Calculated Osmolality 03/26 x10(3) uL Final   • Monocyte Absolute 03/26/2019 0.34  0.10 - 1.00 x10(3) uL Final   • Eosinophil Absolute 03/26/2019 0.11  0.00 - 0.70 x10(3) uL Final   • Basophil Absolute 03/26/2019 0.03  0.00 - 0.20 x10(3) uL Final   • Immature Granulocyte Absolute 03/ GERD and DM. She will take pantoprazole with the diclofenac daily. She will also not take any other NSAIDS, including meloxicam, which was just prescribed to her. I would like for her to ice the knee 20-30 minutes 3-4x/day.  I would like to perform a RIGHT

## 2019-07-08 NOTE — PROGRESS NOTES
Miguel Dupont is a 52year old female who presents for Patient presents with:  Consult  Elbow Pain: with activity  Hand Pain: Stiffness, dryness  Knee Pain: Pain depends on weather  Back Pain: Lower back  Hip Pain  Fatigue  .    HPI:     I had the ple DAY WITH MEALS Disp: 90 tablet Rfl: 3   ERGOCALCIFEROL 02224 units Oral Cap Take 1 capsule by mouth once every week Disp: 4 capsule Rfl: 1   Glucose Blood (ONETOUCH VERIO) In Vitro Strip Check twice a daily Disp: 200 strip Rfl: 0   ertugliflozin (STEGLATRO Aerosol Powder, Breath Activated Inhale 2 puffs into the lungs 2 (two) times daily. Disp: 1 each Rfl: 6   loratadine (CLARITIN) 10 MG Oral Tab Take 1 tablet (10 mg total) by mouth daily.  Disp: 30 tablet Rfl: 6   Amphetamine-Dextroamphet ER 20 MG Oral Capsu photosensitivity, no psoriasis  She gets rashes -   Has fungal infection in her left 4th finger nail and right 1st thumbnail,   Has ridges in nails since being a ,   HEENT: dry eyes, dry mouth, no Raynaud's - hands turn pink, , no nasal ulcers, no p with pseudoarthrosis. 2. Moderate osteoarthritis    6/25/2019 -   Right hip xray   =====  CONCLUSION:   1. Minimal osteoarthritis right hip.  2. Osteoarthritis lower lumbar spine. 3. Transitional vertebra lumbosacral region.     ASSESSMENT AND PLAN:   Onur Hernandez

## 2019-07-08 NOTE — PATIENT INSTRUCTIONS
1. await labs   2. Increase cyclobenzaprine to 10mg to 20mg at night   3. Try meloxicam 15mg a day - take with food -   4. Return to clinic in 2 weeks.

## 2019-07-08 NOTE — PATIENT INSTRUCTIONS
1) Begin formal physical therapy as soon as possible  2) Get Xr of the RIGHT knee on your way out today  3) Take Diclofenac 75 mg 1 tablet twice per day with food for the next two weeks and then as needed but no more than 2 tablets per day.  Do not take wit

## 2019-07-09 LAB
ENA SS-A AB SER QL IA: NEGATIVE
ENA SS-B AB SER QL IA: NEGATIVE
HLA-B27: NEGATIVE

## 2019-07-09 RX ORDER — LORATADINE 10 MG/1
TABLET ORAL
Qty: 90 TABLET | Refills: 1 | Status: SHIPPED | OUTPATIENT
Start: 2019-07-09

## 2019-07-09 RX ORDER — BLOOD SUGAR DIAGNOSTIC
STRIP MISCELLANEOUS
Qty: 200 STRIP | Refills: 0 | Status: SHIPPED | OUTPATIENT
Start: 2019-07-09

## 2019-07-09 NOTE — TELEPHONE ENCOUNTER
Refill passed per [facility] protocol.   Refill Protocol Appointment Criteria  · Appointment scheduled in the past 12 months or in the next 3 months  Recent Outpatient Visits            [address] [place] [address] for Osito Darling

## 2019-07-10 ENCOUNTER — TELEPHONE (OUTPATIENT)
Dept: ENDOCRINOLOGY CLINIC | Facility: CLINIC | Age: 49
End: 2019-07-10

## 2019-07-10 LAB
25(OH)D3 SERPL-MCNC: 34 NG/ML (ref 30–100)
CCP IGG SERPL-ACNC: 1.4 U/ML (ref 0–6.9)

## 2019-07-10 NOTE — TELEPHONE ENCOUNTER
Pt. states that she has a new job, and is not able to keep f/up appt. on 7/16/19, so the pt. Wants to know if she is able to be seen on 7/15, 7/18, or 7/19. Pt. Is requesting for RN to call her back before 11:30am today to let her know.  Pt. States that she

## 2019-07-12 ENCOUNTER — TELEPHONE (OUTPATIENT)
Dept: NEUROLOGY | Facility: CLINIC | Age: 49
End: 2019-07-12

## 2019-07-12 NOTE — TELEPHONE ENCOUNTER
Called [facility] for authorization of approval of RIGHT pes anserine bursa CSI under ultrasound guidance cpt code 83621,91763,  [facility] . No prior authorization required. Reference#[id number]. Will inform Nursing.

## 2019-07-14 NOTE — TELEPHONE ENCOUNTER
Please review; protocol failed.     Requested Prescriptions     Pending Prescriptions Disp Refills   • VICTOZA 18 MG/3ML Subcutaneous Solution Pen-injector [Pharmacy Med Name: Victoza Pen (2 Pack)  Inj Lupillo] 3 mL 0     Sig: INJECT 1.2MG SUBCUTANEOUSLY DAILY

## 2019-07-18 ENCOUNTER — OFFICE VISIT (OUTPATIENT)
Dept: ENDOCRINOLOGY CLINIC | Facility: CLINIC | Age: 49
End: 2019-07-18
Payer: MEDICAID

## 2019-07-18 VITALS
SYSTOLIC BLOOD PRESSURE: 130 MMHG | BODY MASS INDEX: 35 KG/M2 | DIASTOLIC BLOOD PRESSURE: 85 MMHG | WEIGHT: 205.19 LBS | HEART RATE: 106 BPM

## 2019-07-18 DIAGNOSIS — R74.8 ABNORMAL LIVER ENZYMES: ICD-10-CM

## 2019-07-18 DIAGNOSIS — Z79.4 TYPE 2 DIABETES MELLITUS WITHOUT COMPLICATION, WITH LONG-TERM CURRENT USE OF INSULIN (HCC): Primary | ICD-10-CM

## 2019-07-18 DIAGNOSIS — E78.00 PURE HYPERCHOLESTEROLEMIA: ICD-10-CM

## 2019-07-18 DIAGNOSIS — E11.9 TYPE 2 DIABETES MELLITUS WITHOUT COMPLICATION, WITH LONG-TERM CURRENT USE OF INSULIN (HCC): Primary | ICD-10-CM

## 2019-07-18 DIAGNOSIS — E55.9 VITAMIN D DEFICIENCY: ICD-10-CM

## 2019-07-18 LAB
GLUCOSE BLOOD: 174
TEST STRIP LOT #: NORMAL NUMERIC

## 2019-07-18 PROCEDURE — 36416 COLLJ CAPILLARY BLOOD SPEC: CPT | Performed by: NURSE PRACTITIONER

## 2019-07-18 PROCEDURE — 82962 GLUCOSE BLOOD TEST: CPT | Performed by: NURSE PRACTITIONER

## 2019-07-18 PROCEDURE — 99214 OFFICE O/P EST MOD 30 MIN: CPT | Performed by: NURSE PRACTITIONER

## 2019-07-18 RX ORDER — PIOGLITAZONEHYDROCHLORIDE 30 MG/1
30 TABLET ORAL DAILY
Qty: 30 TABLET | Refills: 2 | Status: SHIPPED | OUTPATIENT
Start: 2019-07-18 | End: 2019-10-16

## 2019-07-18 NOTE — PATIENT INSTRUCTIONS
CURRENT DIABETIC MEDICATIONS INCLUDE:  Victoza 1.8 mg daily  Basaglar 20 daily: -----Increase to  25 units daily   Glipizide ER 5 mg TID  stegltaro 15 mg po daily   Add Actos 30 mg po ruben ceja

## 2019-07-18 NOTE — PROGRESS NOTES
Follow up visit - Diabetes     Reason for Visit:  Diabetes management.     Requesting Physician: Dr. Deborah Mullen:  Diabetes   POC blood sugar 174 today in clinic     HISTORY OF PRESENT ILLNESS:   Caterina Grewal is a 52year old daily. Take with food for 2 weeks as directed and then as needed. Disp: 60 tablet Rfl: 1   ROSUVASTATIN CALCIUM 10 MG Oral Tab Take 1 tablet (10 mg total) by mouth nightly.  Disp: 30 tablet Rfl: 1   AMLODIPINE BESYLATE 10 MG Oral Tab TAKE ONE TABLET BY MOUT Take 20 mg by mouth every morning before breakfast.  ) Disp: 30 tablet Rfl: 3   Insulin Pen Needle (PEN NEEDLES) 31G X 5 MM Does not apply Misc 1 each by Does not apply route as needed.  Use pen needle as directed Disp: 100 each Rfl: 6   Insulin Syringe 30G tobacco: Never Used      Tobacco comment: 0.5 units per day    Substance and Sexual Activity      Alcohol use: No      Drug use: No      Sexual activity: Yes        Partners: Male    Other Topics      Concerns:        Caffeine Concern: Yes          2 cups conjunctiva normal  ENT:  Normocephalic, atraumatic  NECK: no adenopathy, thyroid symmetric, not enlarged and no tenderness,  LUNGS: clear to auscultation bilaterally, no crackles or wheezing  CARDIOVASCULAR:  regular rate and rhythm, normal S1 and S2  ABD great detail, to get log and glucometer on next visit. f). Life style changes: Diet: low carbohydrate diet discussed, Exercise: should target a weight loss of 7% and increase exercise to at least 150min a week.    g).  Hypoglycemia recognition and manage

## 2019-08-08 ENCOUNTER — TELEPHONE (OUTPATIENT)
Dept: INTERNAL MEDICINE CLINIC | Facility: CLINIC | Age: 49
End: 2019-08-08

## 2019-08-08 ENCOUNTER — NURSE TRIAGE (OUTPATIENT)
Dept: OTHER | Age: 49
End: 2019-08-08

## 2019-08-08 RX ORDER — AMOXICILLIN AND CLAVULANATE POTASSIUM 875; 125 MG/1; MG/1
1 TABLET, FILM COATED ORAL 2 TIMES DAILY
Qty: 14 TABLET | Refills: 0 | Status: SHIPPED | OUTPATIENT
Start: 2019-08-08 | End: 2019-09-03

## 2019-08-08 RX ORDER — GUAIFENESIN AND CODEINE PHOSPHATE 10; 100 MG/5ML; MG/5ML
LIQUID ORAL
Qty: 180 ML | Refills: 0 | Status: SHIPPED | OUTPATIENT
Start: 2019-08-08 | End: 2019-09-03

## 2019-08-08 RX ORDER — CODEINE PHOSPHATE AND GUAIFENESIN 10; 100 MG/5ML; MG/5ML
SOLUTION ORAL
Qty: 180 ML | Refills: 0 | OUTPATIENT
Start: 2019-08-08 | End: 2019-09-03

## 2019-08-08 NOTE — TELEPHONE ENCOUNTER
Action Requested: Summary for Provider     []  Critical Lab, Recommendations Needed  [] Need Additional Advice  []   FYI    []   Need Orders  [] Need Medications Sent to Pharmacy  []  Other     SUMMARY: Pt requesting RX- stated she has a sinus infection, y

## 2019-08-08 NOTE — TELEPHONE ENCOUNTER
Review pended refill request as it does not fall under a protocol.     Requested Prescriptions     Pending Prescriptions Disp Refills   • GUAIATUSSIN -10 MG/5ML Oral Syrup [Pharmacy Med Name: Guaiatussin Ac 100-10 Mg/5ml Syp Hi-T] 180 mL 0     Sig: TA

## 2019-08-08 NOTE — TELEPHONE ENCOUNTER
Spoke to patient, prescription for Augmentin faxed to the pharmacy on file, she will report if she is not improving, needs note for work for today she will print out from my chart

## 2019-08-08 NOTE — TELEPHONE ENCOUNTER
Call received from [address] [place] [address] , she states pt is waiting for medication in the store. Lynclifford'd Dr Nadia Laird and response received , Dr Nadia Laird will call Pharmacy.

## 2019-08-27 ENCOUNTER — TELEPHONE (OUTPATIENT)
Dept: OTHER | Age: 49
End: 2019-08-27

## 2019-08-27 NOTE — TELEPHONE ENCOUNTER
Spoke w/ [place]. She is not currently out of medication but states she was only able to afford 1 pen of Basaglar which she is currently using. BG fasting this morning 188. She is working on [employer].  Discussed that until insurance coverage

## 2019-08-27 NOTE — TELEPHONE ENCOUNTER
Please confirm regimen  Basaglar  25 daily   Glipizide ER 5 mg TID  Actos 30 mg daily  Steglatro 15 mg daily  Victoza 1.2 mg daily    Can we given her free coupon for any basal insulin tresiba, levemir, lantus toujeo?   Also, glipizpide and actos are on the

## 2019-08-27 NOTE — TELEPHONE ENCOUNTER
Calling and states that she does not have any insurance right now since 7/31/19, states that she is very sick since weekend, with cold symptoms, low grade fever, sore throat, episode of \"passing out \" twice over the weekend, advised ER due to the symptom

## 2019-08-27 NOTE — TELEPHONE ENCOUNTER
Patient calling and requesting if Dr Omar Lema has samples of Basaglar and Victoza, states that she does not have any insurance as of 7/31/19 and up to now, states that she cannot afford to buy all her medications now, with cold symptoms started over the w

## 2019-08-28 NOTE — TELEPHONE ENCOUNTER
Spoke to patient, advised that she should be seen, states that she is not going anywhere but will come and see me on September 1 at 6:40 PM.  She reports that past Sunday she had blood sugar in 300s felt weak did not feel good she is taking all medications

## 2019-08-29 NOTE — TELEPHONE ENCOUNTER
LMTCB please transfer to triage. Thanks  I will also sent pt a [employer] message to call us back. Does she still have a cough?

## 2019-08-31 NOTE — TELEPHONE ENCOUNTER
Patient states she's had cold symptoms \"on and off for months\" and stated has an OV with Dr. Noam Donovan on 9/3 and wants to wait to see the doctor before having cough medicine prescribed.

## 2019-09-01 RX ORDER — ROSUVASTATIN CALCIUM 10 MG/1
10 TABLET, COATED ORAL NIGHTLY
Qty: 90 TABLET | Refills: 1 | Status: SHIPPED | OUTPATIENT
Start: 2019-09-01 | End: 2020-06-17

## 2019-09-01 RX ORDER — GUAIFENESIN AND CODEINE PHOSPHATE 10; 100 MG/5ML; MG/5ML
LIQUID ORAL
Qty: 180 ML | Refills: 0 | OUTPATIENT
Start: 2019-09-01

## 2019-09-01 NOTE — TELEPHONE ENCOUNTER
Cholesterol Medications  Protocol Criteria:  · Appointment scheduled in the past 12 months or in the next 3 months  · ALT & LDL on file in the past 12 months  · ALT result < 80  · LDL result <130   Recent Outpatient Visits            1 month ago Type 2

## 2019-09-03 ENCOUNTER — OFFICE VISIT (OUTPATIENT)
Dept: INTERNAL MEDICINE CLINIC | Facility: CLINIC | Age: 49
End: 2019-09-03

## 2019-09-03 VITALS
SYSTOLIC BLOOD PRESSURE: 122 MMHG | TEMPERATURE: 98 F | BODY MASS INDEX: 36 KG/M2 | WEIGHT: 208 LBS | RESPIRATION RATE: 22 BRPM | HEART RATE: 87 BPM | DIASTOLIC BLOOD PRESSURE: 74 MMHG

## 2019-09-03 DIAGNOSIS — J45.21 MILD INTERMITTENT ASTHMA WITH ACUTE EXACERBATION: Primary | ICD-10-CM

## 2019-09-03 RX ORDER — GUAIFENESIN AND CODEINE PHOSPHATE 10; 100 MG/5ML; MG/5ML
LIQUID ORAL
Qty: 180 ML | Refills: 0 | OUTPATIENT
Start: 2019-09-03

## 2019-09-03 RX ORDER — MONTELUKAST SODIUM 10 MG/1
10 TABLET ORAL NIGHTLY
Qty: 30 TABLET | Refills: 3 | Status: SHIPPED | OUTPATIENT
Start: 2019-09-03 | End: 2020-01-14

## 2019-09-03 NOTE — TELEPHONE ENCOUNTER
Rebeca Thompson RN         11:20 AM   Note      Patient states she's had cold symptoms \"on and off for months\" and stated has an OV with Dr. Chad Morejon on 9/3 and wants to wait to see the doctor before having cough medicine prescribed.

## 2019-09-08 NOTE — PROGRESS NOTES
HPI:    Patient ID: Taylor Saeed is a 52year old female.   Presents for evaluation of the chest congestion    HPI  Patient reports that for several weeks she has been suffering from the cough and chest heaviness and congestion, claims that she has b Cyclobenzaprine HCl 10 MG Oral Tab Take 10-20mg at night. Disp: 60 tablet Rfl: 1   Diclofenac Sodium 75 MG Oral Tab EC Take 1 tablet (75 mg total) by mouth 2 (two) times daily. Take with food for 2 weeks as directed and then as needed.  Disp: 60 tablet Rf Rfl: 5   Lancets Misc. Does not apply Misc Use to check blood glucose three times daily. Disp: 300 each Rfl: 1   Insulin Pen Needle (PEN NEEDLES) 31G X 5 MM Does not apply Misc 1 each by Does not apply route as needed.  Use pen needle as directed Disp: 100 • Montelukast Sodium 10 MG Oral Tab 30 tablet 3     Sig: Take 1 tablet (10 mg total) by mouth nightly.        Imaging & Referrals:  None         AY#[id number]

## 2019-10-09 NOTE — TELEPHONE ENCOUNTER
Called patient LMTCB regarding request for cough syrup, please inquire why patient is requesting medication.

## 2019-10-10 NOTE — TELEPHONE ENCOUNTER
LMTCB on [phone]--3rd attempt. Spoke with Mohsen Tran ()--he relayed patient's home #[phone]--\"IHR it ring and ring--she will . \"    Spoke with patient RE: Guaiatussin refill request--hesitant to discuss symptoms. Patient states, \"I am sick right now--on and off cough, cold and fever. I can't make an appointment right now--I have a problem with my insurance. I have been taking [place] cold & flu--I'm doing the best I can. I have to let you go right now--I'm having diarrhea and have to get to my toilet. I don't want to bother Dr. Lukas Chowdary about it right now. \"     Sent to Dr. Lukas Chowdary as [employer] and any further recommendations      Preferred contact # : cell  Home: none  Cell: [phone]  Work: none  Consent to leave test results on voicemail:  Home: no Cell: yes Work: no   May release health information to:  Mansi Rasmussen mother  none    [facility] Pharmacy[facility]

## 2019-10-15 RX ORDER — GUAIFENESIN AND CODEINE PHOSPHATE 10; 100 MG/5ML; MG/5ML
LIQUID ORAL
Qty: 180 ML | Refills: 0 | OUTPATIENT
Start: 2019-10-15

## 2019-10-15 NOTE — TELEPHONE ENCOUNTER
Please let patient know that I denied cough suppressant, if patient does not feel well she needs to be reevaluated again

## 2019-10-22 ENCOUNTER — TELEPHONE (OUTPATIENT)
Dept: INTERNAL MEDICINE CLINIC | Facility: CLINIC | Age: 49
End: 2019-10-22

## 2019-10-22 NOTE — TELEPHONE ENCOUNTER
Patient states she still has a cough and fever since 10/7 (see TE encounter). Patient requesting a call back from Dr. Nick Ramirez as she states she can't get out of bed to work today.   Patient states she's been taking OTC medication with no relief and that

## 2019-10-23 ENCOUNTER — TELEPHONE (OUTPATIENT)
Dept: ENDOCRINOLOGY CLINIC | Facility: CLINIC | Age: 49
End: 2019-10-23

## 2019-10-23 ENCOUNTER — TELEPHONE (OUTPATIENT)
Dept: INTERNAL MEDICINE CLINIC | Facility: CLINIC | Age: 49
End: 2019-10-23

## 2019-10-23 RX ORDER — AMOXICILLIN 500 MG/1
500 CAPSULE ORAL 3 TIMES DAILY
Qty: 30 CAPSULE | Refills: 0 | Status: SHIPPED | OUTPATIENT
Start: 2019-10-23 | End: 2019-11-02

## 2019-10-23 RX ORDER — CODEINE PHOSPHATE AND GUAIFENESIN 10; 100 MG/5ML; MG/5ML
5 SOLUTION ORAL 4 TIMES DAILY PRN
Qty: 240 ML | Refills: 0 | Status: SHIPPED | OUTPATIENT
Start: 2019-10-23 | End: 2020-01-14

## 2019-10-23 NOTE — TELEPHONE ENCOUNTER
Called patient x twice no answer  Called patient , she is screaming and angry  On the phone. States that she is disappointed.    \" I am sick with a sinus infection and I can not drive to the pharmacy \"     She states that : I am not trying to harm mys

## 2019-10-23 NOTE — TELEPHONE ENCOUNTER
Patient calling back to follow up, reiterated many of the concerns listed below. Advised that message had already been sent to Dr. Moe Smith who is currently seeing patient's Awaiting response.

## 2019-10-23 NOTE — TELEPHONE ENCOUNTER
Spoke with patient-\"really upset--she called this number-I usually don't answer this phone--she needs to call my 331 number. \"    Please call patient at [phone]

## 2019-10-23 NOTE — TELEPHONE ENCOUNTER
Spoke to patient, she feels congestion in the nose, sore throat swollen glands, for several days, could not go to work yesterday and today, will send prescription for amoxicillin, and Cheratussin AC, excuse note mailed to the patient, patient knows that sh

## 2019-10-23 NOTE — TELEPHONE ENCOUNTER
Patient following up again, had requested a work note due to being sick, is also requesting an antibiotic. Patient refusing to make appt, reports no insurance at this time.  Is also requesting the work note be faxed over to the pharmacy, she confirmed they

## 2019-10-23 NOTE — TELEPHONE ENCOUNTER
Called pharmacy - patient can use voucher to  free box of Basaglar. They will prepare for patient. [facility] patient assistance phone # [phone] and discussed the above.  Stressed importance of restarting diabetic medications inc
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
## 2019-10-23 NOTE — TELEPHONE ENCOUNTER
Call transferred from Dr Howe Grandchild hyperglycemia. Pt is crying, then angry, words are not making sense and she is difficult to follow on the phone     , 294, Pt is not taking any of her medications for DM.  Pt is testing but infrequently \"I

## 2019-12-21 NOTE — TELEPHONE ENCOUNTER
Review pended refill request as it does not fall under a protocol.   Requested Prescriptions     Pending Prescriptions Disp Refills   • AMLODIPINE BESYLATE 10 MG Oral Tab [Pharmacy Med Name: Amlodipine Besylate 10 Mg Tab Cipl] 90 tablet 0     Sig: TAKE ONE

## 2019-12-22 RX ORDER — AMLODIPINE BESYLATE 10 MG/1
TABLET ORAL
Qty: 90 TABLET | Refills: 1 | Status: SHIPPED | OUTPATIENT
Start: 2019-12-22 | End: 2020-06-17

## 2020-01-11 ENCOUNTER — NURSE TRIAGE (OUTPATIENT)
Dept: OTHER | Age: 50
End: 2020-01-11

## 2020-01-11 NOTE — TELEPHONE ENCOUNTER
Please reply to pool: EM RN TRIAGE      Action Requested: Summary for Provider     []  Critical Lab, Recommendations Needed  [x] Need Additional Advice  []   FYI    []   Need Orders  [] Need Medications Sent to Pharmacy  []  Other     SUMMARY: Pt contacts

## 2020-01-11 NOTE — TELEPHONE ENCOUNTER
Left message to call back. Please transfer to triage. 1st attempt. I have also sent patient a [employer] message to call us back.

## 2020-01-13 DIAGNOSIS — I10 ESSENTIAL HYPERTENSION: ICD-10-CM

## 2020-01-13 DIAGNOSIS — E78.00 PURE HYPERCHOLESTEROLEMIA: ICD-10-CM

## 2020-01-13 DIAGNOSIS — E11.9 TYPE 2 DIABETES MELLITUS WITHOUT COMPLICATION, WITH LONG-TERM CURRENT USE OF INSULIN (HCC): Primary | ICD-10-CM

## 2020-01-13 DIAGNOSIS — Z79.4 TYPE 2 DIABETES MELLITUS WITHOUT COMPLICATION, WITH LONG-TERM CURRENT USE OF INSULIN (HCC): Primary | ICD-10-CM

## 2020-01-13 NOTE — TELEPHONE ENCOUNTER
Spoke with the patient who reports she did not go to the ER over the weekend. Patient reports she is still not feeling better. She reports she has muscle aches, throat hurts, and nose is dry. Phlegm has a yellow color.  She also reporting having vaginal bur

## 2020-01-14 ENCOUNTER — OFFICE VISIT (OUTPATIENT)
Dept: INTERNAL MEDICINE CLINIC | Facility: CLINIC | Age: 50
End: 2020-01-14

## 2020-01-14 VITALS
HEART RATE: 85 BPM | BODY MASS INDEX: 33 KG/M2 | DIASTOLIC BLOOD PRESSURE: 79 MMHG | RESPIRATION RATE: 20 BRPM | TEMPERATURE: 98 F | WEIGHT: 193 LBS | SYSTOLIC BLOOD PRESSURE: 144 MMHG

## 2020-01-14 DIAGNOSIS — E11.9 TYPE 2 DIABETES MELLITUS WITHOUT COMPLICATION, WITH LONG-TERM CURRENT USE OF INSULIN (HCC): ICD-10-CM

## 2020-01-14 DIAGNOSIS — I10 ESSENTIAL HYPERTENSION: ICD-10-CM

## 2020-01-14 DIAGNOSIS — J01.01 ACUTE RECURRENT MAXILLARY SINUSITIS: Primary | ICD-10-CM

## 2020-01-14 DIAGNOSIS — Z79.4 TYPE 2 DIABETES MELLITUS WITHOUT COMPLICATION, WITH LONG-TERM CURRENT USE OF INSULIN (HCC): ICD-10-CM

## 2020-01-14 PROCEDURE — 99212 OFFICE O/P EST SF 10 MIN: CPT | Performed by: INTERNAL MEDICINE

## 2020-01-14 RX ORDER — DOXYCYCLINE HYCLATE 100 MG
100 TABLET ORAL 2 TIMES DAILY
Qty: 20 TABLET | Refills: 0 | Status: SHIPPED | OUTPATIENT
Start: 2020-01-14

## 2020-01-14 RX ORDER — CODEINE PHOSPHATE AND GUAIFENESIN 10; 100 MG/5ML; MG/5ML
5 SOLUTION ORAL 4 TIMES DAILY PRN
Qty: 240 ML | Refills: 0 | Status: SHIPPED | OUTPATIENT
Start: 2020-01-14

## 2020-01-15 NOTE — PROGRESS NOTES
HPI:    Patient ID: Judee Bence is a 52year old female.   Presents for evaluation of the cold symptoms    HPI  Patient reports that 5 days ago she developed body aches chills, headache and pressure over her face, experiencing postnasal nasal draina NIGHTLY 45 g 0   • Amphetamine-Dextroamphet ER 20 MG Oral Capsule SR 24 Hr Take 20 mg by mouth every morning. • alprazolam (XANAX) 1 MG Oral Tab 3 (three) times daily as needed.     2   • Liraglutide (VICTOZA) 18 MG/3ML Subcutaneous Solution Pen-injecto Head: Normocephalic and atraumatic. Right Ear: Ear canal normal.   Left Ear: Ear canal normal.   Nose: Mucosal edema present. Mouth/Throat: Posterior oropharyngeal erythema present.    Postnasal drainage present no exudate.,  Purulent secretions cover (four) times daily as needed for cough.        Imaging & Referrals:  None         #[id number]

## 2020-01-17 ENCOUNTER — TELEPHONE (OUTPATIENT)
Dept: INTERNAL MEDICINE CLINIC | Facility: CLINIC | Age: 50
End: 2020-01-17

## 2020-01-17 NOTE — TELEPHONE ENCOUNTER
Agree that patient should be taken to emergency room for evaluation. immediate care center she needs probably x-rays blood work

## 2020-01-17 NOTE — TELEPHONE ENCOUNTER
Patient's fiance, Volanda Paget, called in with symptoms below:    -Fever  -Body chills/aches  -Body flushed    Volanda Paget states that she's had the fever for two days. CSS transferred to triage/decision tree denies appointment.

## 2020-01-17 NOTE — TELEPHONE ENCOUNTER
Patient Christopher Nunez not on STEPHENIE  Patient cannot even talk to triage nurse on phone. Severe coughing. Jessy Bello said patient has fever no thermometer, body aches, continuous coughing. Dr. Krupa Viera has no available appointments.   Advise needs to be seen and

## 2020-01-20 NOTE — TELEPHONE ENCOUNTER
Pt calling to state she did not go to ER or UC. Pt still has productive cough with green phlegm and per pt \"I am just starting to eat again\"      Pt denies fever, tolerating fluids well. \"I am still not feeling well, very fatigued and off balance.

## 2020-01-21 NOTE — TELEPHONE ENCOUNTER
Spoke to pt she is feeling somewhat better, provided patient with a note she requested.   Advised if symptoms are worsening go to emergency room for further evaluation

## 2020-01-21 NOTE — TELEPHONE ENCOUNTER
Patient called stating she needs a note for work before she loses her job. Patient states she does not feel well, pt coughing throughout call.  Patient reports thick yellow, green mucous when coughing and blowing her nose, feels a little dizzy and off ba

## 2020-06-17 RX ORDER — ROSUVASTATIN CALCIUM 10 MG/1
TABLET, COATED ORAL
Qty: 90 TABLET | Refills: 0 | Status: SHIPPED | OUTPATIENT
Start: 2020-06-17 | End: 2020-10-19

## 2020-06-17 RX ORDER — AMLODIPINE BESYLATE 10 MG/1
TABLET ORAL
Qty: 90 TABLET | Refills: 0 | Status: SHIPPED | OUTPATIENT
Start: 2020-06-17 | End: 2020-06-20

## 2020-06-20 RX ORDER — AMLODIPINE BESYLATE 10 MG/1
TABLET ORAL
Qty: 90 TABLET | Refills: 0 | Status: SHIPPED | OUTPATIENT
Start: 2020-06-20 | End: 2020-10-19

## 2020-10-06 ENCOUNTER — TELEPHONE (OUTPATIENT)
Dept: INTERNAL MEDICINE CLINIC | Facility: CLINIC | Age: 50
End: 2020-10-06

## 2020-10-06 DIAGNOSIS — E11.9 TYPE 2 DIABETES MELLITUS WITHOUT COMPLICATION, WITH LONG-TERM CURRENT USE OF INSULIN (HCC): Primary | ICD-10-CM

## 2020-10-06 DIAGNOSIS — Z79.4 TYPE 2 DIABETES MELLITUS WITHOUT COMPLICATION, WITH LONG-TERM CURRENT USE OF INSULIN (HCC): Primary | ICD-10-CM

## 2020-10-06 NOTE — TELEPHONE ENCOUNTER
REFILL REQUEST ON THE FOLLOWING.     Current Outpatient Medications   Medication Sig Dispense Refill   •       •       •       •       • insulin glargine (BASAGLAR KWIKPEN) 100 UNIT/ML Subcutaneous Solution Pen-injector Inject 25 Units into the skin nightly

## 2020-10-19 ENCOUNTER — TELEPHONE (OUTPATIENT)
Dept: INTERNAL MEDICINE CLINIC | Facility: CLINIC | Age: 50
End: 2020-10-19

## 2020-10-19 RX ORDER — INSULIN GLARGINE 100 [IU]/ML
INJECTION, SOLUTION SUBCUTANEOUS
Qty: 15 ML | Refills: 0 | Status: SHIPPED | OUTPATIENT
Start: 2020-10-19

## 2020-10-19 RX ORDER — INSULIN GLARGINE 100 [IU]/ML
25 INJECTION, SOLUTION SUBCUTANEOUS NIGHTLY
Qty: 15 ML | Refills: 0 | Status: SHIPPED | OUTPATIENT
Start: 2020-10-19

## 2020-10-19 RX ORDER — AMLODIPINE BESYLATE 10 MG/1
TABLET ORAL
Qty: 90 TABLET | Refills: 0 | Status: SHIPPED | OUTPATIENT
Start: 2020-10-19 | End: 2021-06-26

## 2020-10-19 RX ORDER — PEN NEEDLE, DIABETIC 30 GX3/16"
1 NEEDLE, DISPOSABLE MISCELLANEOUS AS NEEDED
Qty: 100 EACH | Refills: 6 | Status: SHIPPED | OUTPATIENT
Start: 2020-10-19

## 2020-10-19 RX ORDER — ROSUVASTATIN CALCIUM 10 MG/1
TABLET, COATED ORAL
Qty: 90 TABLET | Refills: 0 | Status: SHIPPED | OUTPATIENT
Start: 2020-10-19

## 2020-10-19 NOTE — TELEPHONE ENCOUNTER
Spoke to patient, she states that currently she is covered under Medicaid, advised that she can make appointment for blood work, as long as she is not assigned to other insurance, also advised her to consider going to emergency room where basic evaluation

## 2020-10-19 NOTE — TELEPHONE ENCOUNTER
Spoke with patient ( verified)--has been out of insulin, \"for a while. \"    Patient reports her new Medicaid insurance doesn't cover insulin--pharmacy states cost is $700.     \"I won't be able to make an appointment until the end of the November or Dec

## 2020-10-20 NOTE — TELEPHONE ENCOUNTER
Patient has not seen me since 2019  Also noted that refills have been sent by PCP  Also routed to Dr. Mio Infante

## 2020-10-21 ENCOUNTER — TELEPHONE (OUTPATIENT)
Dept: INTERNAL MEDICINE CLINIC | Facility: CLINIC | Age: 50
End: 2020-10-21

## 2020-10-21 NOTE — TELEPHONE ENCOUNTER
Incoming call from [address] [place] [address].   Pharmacist asked how much QTY to dispense. They received DME order for DM supplies. Advised  for 3 month supply for both strips and lancets. Patient testing 3 times daily.

## 2020-10-21 NOTE — TELEPHONE ENCOUNTER
Marlene Kay, pharmacist from [address] [address] contacted office. Outpatient Medication Detail     Disp Refills Start End    BASAGLAR KWIKPEN 100 UNIT/ML Subcutaneous Solution Pen-injector 15 mL 0 10/19/2020     Sig: Inject 25 Units into the skin nightly.     Sent to pharmacy

## 2021-02-12 NOTE — TELEPHONE ENCOUNTER
Left message to call back on patient's voicemail. We need to find out why patient is requesting cheratussin refill?

## 2021-03-29 ENCOUNTER — TELEPHONE (OUTPATIENT)
Dept: INTERNAL MEDICINE CLINIC | Facility: CLINIC | Age: 51
End: 2021-03-29

## 2021-03-29 NOTE — TELEPHONE ENCOUNTER
Left message for patient to call back.  Patient is due for annual physical, mammogram  and should also schedule GI consult for colonoscopy

## 2021-06-25 NOTE — TELEPHONE ENCOUNTER
[place] pharmacy is requesting a refill.     AMLODIPINE BESYLATE 10 MG Oral Tab      Glucose Blood (ONETOUCH VERIO) In Vitro Strip

## 2021-06-26 ENCOUNTER — TELEPHONE (OUTPATIENT)
Dept: INTERNAL MEDICINE CLINIC | Facility: CLINIC | Age: 51
End: 2021-06-26

## 2021-06-26 RX ORDER — BLOOD SUGAR DIAGNOSTIC
STRIP MISCELLANEOUS
Qty: 100 STRIP | Refills: 0
Start: 2021-06-26

## 2021-06-26 RX ORDER — AMLODIPINE BESYLATE 10 MG/1
10 TABLET ORAL DAILY
Qty: 90 TABLET | Refills: 0 | OUTPATIENT
Start: 2021-06-26

## 2021-06-26 RX ORDER — AMLODIPINE BESYLATE 10 MG/1
10 TABLET ORAL DAILY
Qty: 30 TABLET | Refills: 0 | Status: SHIPPED | OUTPATIENT
Start: 2021-06-26

## 2021-06-26 NOTE — TELEPHONE ENCOUNTER
Please call patient she needs follow-up appointment last visit 18 months ago I refilled medications only for 30 days

## 2021-07-25 RX ORDER — BLOOD-GLUCOSE METER
EACH MISCELLANEOUS
Qty: 1 KIT | Refills: 0 | OUTPATIENT
Start: 2021-07-25

## 2021-09-04 RX ORDER — AMLODIPINE BESYLATE 10 MG/1
TABLET ORAL
Qty: 30 TABLET | Refills: 0 | OUTPATIENT
Start: 2021-09-04

## 2021-09-04 NOTE — TELEPHONE ENCOUNTER
Please call patient I cannot continue refilling medication he has not been seen in the office in more than 18 months

## 2021-09-06 NOTE — TELEPHONE ENCOUNTER
[employer] message with recommendation sent to pt. Please review refill protocol failed/ no protocol  Requested Prescriptions   Pending Prescriptions Disp Refills    AMLODIPINE 10 MG Oral Tab [Pharmacy Med Name: Amlodipine Besylate 10 Mg Tab Cipl] 30 tablet 0     Sig: TAKE ONE TABLET BY MOUTH ONE TIME DAILY        Hypertensive Medications Protocol Failed - 9/4/2021  3:47 PM        Failed - CMP or BMP in past 12 months        Failed - Appointment in past 6 or next 3 months        Passed - GFR Non- > 50     Lab Results   Component Value Date    GFRNAA 102 07/08/2019                      No future appointments.

## 2021-09-06 NOTE — TELEPHONE ENCOUNTER
Please call patient she is due for follow-up visit, I cannot continue refilling blood pressure medication without follow-up

## 2021-09-09 NOTE — TELEPHONE ENCOUNTER
Please follow-up with patient so my chart message will give you a call she may have another physician who can continue prescribing her medications

## 2021-09-11 RX ORDER — AMLODIPINE BESYLATE 10 MG/1
10 TABLET ORAL DAILY
Qty: 90 TABLET | Refills: 0 | OUTPATIENT
Start: 2021-09-11

## 2022-04-28 NOTE — TELEPHONE ENCOUNTER
No Protocol on this med.      Requested Prescriptions     Pending Prescriptions Disp Refills   • TERCONAZOLE 0.4 % Vaginal Cream [Pharmacy Med Name: Terconazole Vaginal Cream 0.4 %] 45 g 0     Sig: PLACE 1 APPLICATOR VAGINALLY NIGHTLY       Last Office Visi Oxybutynin Pregnancy And Lactation Text: This medication is Pregnancy Category B and is considered safe during pregnancy. It is unknown if it is excreted in breast milk.

## (undated) NOTE — LETTER
August 10, 2017    Kasia Ely MD  [address] [address]     Patient: Cherrie Mckeon   YOB: 1970   Date of Visit: 8/10/2017       Dear Dr. Marii Bustillo MD:    Thank you for referring Azeem Hutchison • Glaucoma Neg    • Macular degeneration Neg        Social History: Smoking status: Current Every Day Smoker                                                   Packs/day: 1.00      Years: 22.00        Types: Cigarettes  Smokeless tobacco: Never Used Needle, Disp, (BD DISP NEEDLES) 30G X 1/2\" Does not apply Misc Pen needles to be used for Byetta injections Disp: 200 each Rfl: 3   VENTOLIN  (90 BASE) MCG/ACT Inhalation Aero Soln INHALE 2 PUFFS BY MOUTH INTO THE LUNGS EVERY 4 HOURS AS NEEDED FOR Disc Good rim Good rim    C/D Ratio 0.55 0.55    Macula Normal- no BDR Normal- no BDR    Vessels Normal Normal    Periphery Normal Normal            Refraction     Manifest Refraction       Sphere Cylinder [place] Add [place]    Right -4.50 Sphere  2

## (undated) NOTE — MR AVS SNAPSHOT
[facility]  [address] [place] [address], [address]   Carl[place] Show               Thank you for choosing us for your health care visit with Kasia Ely MD.  We are glad to serve you and happy to provide you with this summary of yo Tavist [Clemastine]     Tylenol Sinus Max                 Today's Vital Signs     BP Pulse Temp Weight          131/74 mmHg 92 98 °F (36.7 °C) (Oral) 206 lb (93.441 kg)           Current Medications          This list is accurate as of: 6/20/17  7:51 PM. Take 1 tablet (40 mg total) by mouth every morning before breakfast.   Commonly known as:  PROTONIX           Terconazole 0.4 % Crea   PLACE 1 APPLICATOR VAGINALLY NIGHTLY. [employer] BLOOD GLUCOSE w/Device Kit   by Does not apply route.  Test 3 ti

## (undated) NOTE — LETTER
12/22/2018              [facility] 7        [facility] [place] [address] [id number]         Patient has been diagnosed with degenerative joint disease of the lumbar spine that causing flareup of back pains.   Please al

## (undated) NOTE — Clinical Note
Thank you for the consult. I saw Ms. José Manuel James in the endocrine/diabetes clinic today. Please see attached my note. Please feel free to contact me with any questions. Thanks!

## (undated) NOTE — MR AVS SNAPSHOT
[facility]  [address] [place] [address], [address] [place], [place] [address]marcello Andrew               Thank you for choosing us for your health care visit with William Park MD.  We are glad to serve you and happy to provide you with this summary of yo Assoc Dx:  Encounter for screening mammogram for malignant neoplasm of breast [Z12.31]           Ophthalmology Referral - In Network    Complete by:  As directed    Assoc Dx:  Type 2 diabetes mellitus with complication, with long-term current use of insul [facility]  Diagnostics [facility] Parking) (Yellow Parking)  [address]   [address] [facility],  [place], [address] [facility]  [address]  Main authorization numbers or be assured that none are required. You can then schedule your appointment. Failure to obtain required authorization numbers can create reimbursement difficulties for you.       Assoc Dx:  Cough [R05]        OPHTHALMOLOGY - INTERNAL Commonly known as:  XANAX           AmLODIPine Besylate 5 MG Tabs   TAKE 1 TABLET DAILY BY MOUTH   Commonly known as:  NORVASC           BuPROPion HCl ER (XL) 150 MG Tb24   Take 150 mg by mouth daily.    Commonly known as:  Carry Hang           ergocalci Terconazole 0.4 % Crea   PLACE 1 APPLICATOR VAGINALLY NIGHTLY. topiramate 100 MG Tabs   200 mg. Take 1 tablet at night   Commonly known as: Topamax           TRUETRACK BLOOD GLUCOSE w/Device Kit   by Does not apply route.  Test 3 times a day medical emergencies, dial 911. Visit [email or web address] to learn more.            Visit [facility] online at  [email or web address]

## (undated) NOTE — LETTER
4/30/2019              [facility] [address] [facility]         Please  excuse from work from 4-27-19 till 5-4-19 due to illness.       Sincerely,    Ruy Agee MD  [facility]

## (undated) NOTE — MR AVS SNAPSHOT
[facility]  [address] [place] [address], [address] [place] [address]               Thank you for choosing us for your health care visit with Manjit Rajput MD.  We are glad to serve you and happy to provide you with this summary of yo Montelukast Sodium 10 MG Tabs   TAKE ONE TABLET BY MOUTH NIGHTLY   Commonly known as:  SINGULAIR           Needle (Disp) 30G X 1/2\" Misc   Pen needles to be used for Byetta injections   Commonly known as:  BD DISP NEEDLES           Omeprazole 40 MG Cpdr Assoc Dx: Thrombocytopenia ([facility] [D69.6]           Vitamin B12 [E]    Complete by:  Jan 24, 2017 (Approximate)    Assoc Dx:   Thrombocytopenia [facility]) [D69.6]                 Scheduling Instructions     Tuesday January 24, 2017     Imaging:  US ABDOMEN COMP Don’t eat while when you’re bored.      EAT THESE FOODS MORE OFTEN: EAT THESE FOODS LESS OFTEN:   Make half your plate fruits and vegetables Highly refined, white starches including white bread, rice and pasta   Eat plenty of protein, keep the fat content l

## (undated) NOTE — LETTER
9/10/2021              [facility]        [address] [place] [address]         Dear Erick Tyler,    This letter is to inform you that our office has made several attempts to reach you by phone without success. We were attempting to contact you by phone regarding prescription request.    Please contact our office at the number listed below as soon as you receive this letter to discuss this issue and to make the necessary changes in our system to your contact information. Thank you for your cooperation.         Sincerely,    MD Cheryl Mena [place] , [address] [facility], [address] [place] [address], [address] [address]  [phone]

## (undated) NOTE — LETTER
7/2/2019              [facility] 7        [address] [address]         To Whom It May Concern,    Patient Henok Medrano is under my medical care, due to sickness.  Please excuse patient  from  July 3,201

## (undated) NOTE — LETTER
03/25/19        [address] [facility] [address]      Dear Henok Medrano,    15[facility] records indicate that you have outstanding lab work and or testing that was ordered for you and has not yet been completed:  Yonny

## (undated) NOTE — LETTER
09/10/18        [facility] [address]      Dear Severiano Feliciano,    [address] records indicate that you have outstanding lab work and or testing that was ordered for you and has not yet been completed:  Orders P

## (undated) NOTE — LETTER
1/21/2020              [facility]        [address]        [address] [facility] 700         Please excuse patient from work due to illness. She should be excused from January 11, 2020, from January 17 until January 23, 2020.   She ma

## (undated) NOTE — LETTER
Date & Time: 5/30/2018, 5:15 PM  Patient: Jonny Pace  Encounter Provider(s):    [place] Call       To Whom It May Concern:    Jonny Pace was seen and treated in our department on 5/30/2018.  Please excuse from

## (undated) NOTE — LETTER
10/9/2018              [address] [place] [address] [address]        [place] [address]         Please excuse from work form 9-29-18  till 10-14-18  due to Flu, bronchitis. She  May return to work on 10-15-18.       Sincerely,